# Patient Record
Sex: FEMALE | Race: ASIAN | Employment: FULL TIME | URBAN - METROPOLITAN AREA
[De-identification: names, ages, dates, MRNs, and addresses within clinical notes are randomized per-mention and may not be internally consistent; named-entity substitution may affect disease eponyms.]

---

## 2017-02-20 ENCOUNTER — HOSPITAL ENCOUNTER (EMERGENCY)
Age: 22
Discharge: HOME | End: 2017-02-20
Attending: FAMILY MEDICINE

## 2017-02-20 ENCOUNTER — TELEPHONE (OUTPATIENT)
Dept: INTERNAL MEDICINE CLINIC | Age: 22
End: 2017-02-20

## 2017-02-20 ENCOUNTER — APPOINTMENT (OUTPATIENT)
Dept: GENERAL RADIOLOGY | Age: 22
End: 2017-02-20
Attending: FAMILY MEDICINE

## 2017-02-20 VITALS
DIASTOLIC BLOOD PRESSURE: 67 MMHG | TEMPERATURE: 97.9 F | WEIGHT: 91.6 LBS | OXYGEN SATURATION: 100 % | HEART RATE: 95 BPM | BODY MASS INDEX: 16.23 KG/M2 | SYSTOLIC BLOOD PRESSURE: 112 MMHG | RESPIRATION RATE: 18 BRPM | HEIGHT: 63 IN

## 2017-02-20 DIAGNOSIS — R07.89 RIGHT-SIDED CHEST WALL PAIN: Primary | ICD-10-CM

## 2017-02-20 DIAGNOSIS — R10.11 RUQ ABDOMINAL PAIN: ICD-10-CM

## 2017-02-20 RX ORDER — HYDROCODONE BITARTRATE AND ACETAMINOPHEN 5; 325 MG/1; MG/1
1 TABLET ORAL
Qty: 20 TAB | Refills: 0 | Status: SHIPPED | OUTPATIENT
Start: 2017-02-20 | End: 2017-02-24

## 2017-02-20 RX ORDER — KETOROLAC TROMETHAMINE 30 MG/ML
30 INJECTION, SOLUTION INTRAMUSCULAR; INTRAVENOUS
Status: COMPLETED | OUTPATIENT
Start: 2017-02-20 | End: 2017-02-20

## 2017-02-20 RX ORDER — ONDANSETRON 4 MG/1
4 TABLET, ORALLY DISINTEGRATING ORAL
Qty: 10 TAB | Refills: 0 | Status: SHIPPED | OUTPATIENT
Start: 2017-02-20 | End: 2017-05-16 | Stop reason: ALTCHOICE

## 2017-02-20 RX ORDER — FAMOTIDINE 20 MG/1
20 TABLET, FILM COATED ORAL 2 TIMES DAILY
Qty: 20 TAB | Refills: 0 | Status: SHIPPED | OUTPATIENT
Start: 2017-02-20 | End: 2017-02-23 | Stop reason: ALTCHOICE

## 2017-02-20 RX ADMIN — KETOROLAC TROMETHAMINE 30 MG: 30 INJECTION, SOLUTION INTRAMUSCULAR; INTRAVENOUS at 16:56

## 2017-02-20 NOTE — LETTER
[facility] 
[address] [address] 
[phone] Work/School Note Date: 2/20/2017 To Whom It May concern: 
 
Gladis Hernandez was seen and treated today in the urgent care center by the following provider(s): 
Attending Provider: Lluvia Escamilla MD. Gladis Hernandez may return to work on 2/22/17 with light duty- no lifting/pushing and pulling x 4 days. Sincerely, Lluvia Escamilla MD

## 2017-02-20 NOTE — DISCHARGE INSTRUCTIONS

## 2017-02-20 NOTE — UC NOTE
Spoke with pt. At home and she stated that she felt much better.  Would seek ER treatment if pain continues

## 2017-02-20 NOTE — UC PROVIDER NOTE
Patient is a 25 y.o. female presenting with chest pain. The history is provided by the patient. Chest Pain    This is a new problem. The current episode started 1 to 2 hours ago. The problem has not changed since onset. The problem occurs constantly. Associated with: nothing- started suddenly when taking nap  The pain is present in the right side (lower RIBS and RUQ region ). The pain is at a severity of 8/10. The pain is severe. The quality of the pain is described as sharp. The pain radiates to the mid back. The symptoms are aggravated by movement and palpation. Associated symptoms include abdominal pain (Ruq region ), nausea and vomiting (x1 ). Pertinent negatives include no back pain, no cough, no dizziness, no fever, no irregular heartbeat, no malaise/fatigue, no near-syncope, no numbness, no palpitations, no shortness of breath and no weakness. She has tried nothing for the symptoms. Risk factors include no risk factors. Past Medical History   Diagnosis Date    Anemia         History reviewed. No pertinent past surgical history. History reviewed. No pertinent family history. Social History     Social History    Marital status:      Spouse name: N/A    Number of children: N/A    Years of education: N/A     Occupational History    Not on file. Social History Main Topics    Smoking status: Never Smoker    Smokeless tobacco: Never Used    Alcohol use No    Drug use: No    Sexual activity: No      Comment: single no children     Other Topics Concern    Not on file     Social History Narrative                ALLERGIES: Avocado    Review of Systems   Constitutional: Negative. Negative for fever and malaise/fatigue. No h/o lifting/pushing and pulling    Respiratory: Negative for cough and shortness of breath. Cardiovascular: Positive for chest pain. Negative for palpitations and near-syncope.    Gastrointestinal: Positive for abdominal pain (Ruq region ), nausea and vomiting (x1 ). Genitourinary: Negative. Musculoskeletal: Negative. Negative for back pain. Skin: Negative. Neurological: Negative for dizziness, weakness and numbness. Vitals:    02/20/17 1549   BP: 112/67   Pulse: 95   Resp: 18   Temp: 97.9 °F (36.6 °C)   SpO2: 100%   Weight: 41.5 kg (91 lb 9.6 oz)   Height: 5' 3\" (1.6 m)       Physical Exam   Constitutional: She appears distressed. Sitting slating on left side to decreased pain on rt side   HENT:   Right Ear: Tympanic membrane and ear canal normal.   Left Ear: Tympanic membrane and ear canal normal.   Nose: Nose normal.   Mouth/Throat: No oropharyngeal exudate, posterior oropharyngeal edema or posterior oropharyngeal erythema. Eyes: Conjunctivae are normal. Right eye exhibits no discharge. Left eye exhibits no discharge. No scleral icterus. Neck: Neck supple. Pulmonary/Chest: Effort normal and breath sounds normal. No respiratory distress. She has no decreased breath sounds. She has no wheezes. She has no rales. Abdominal: Soft. Bowel sounds are normal. She exhibits no distension and no mass. There is no hepatosplenomegaly. There is tenderness in the right upper quadrant. There is no rigidity, no rebound, no guarding and no CVA tenderness. Musculoskeletal: She exhibits no edema. Cervical back: Normal.        Thoracic back: Normal.        Lumbar back: Normal.   Lymphadenopathy:     She has no cervical adenopathy. Skin: No rash noted. Nursing note and vitals reviewed. MDM     Differential Diagnosis; Clinical Impression; Plan:     CLINICAL IMPRESSION:  Right-sided chest wall pain  (primary encounter diagnosis)  RUQ abdominal pain      DDX- muscluar pain/ diaphragm spasm- rib sprain/ ?  Gb pain / / kidney stone    Plan:    CXR- normal  toradol 30 mg now  Nathan Jacob  And pepcid    If worsen go to ED    No work 1 day and light duty x 4 days    Amount and/or Complexity of Data Reviewed:   Clinical lab tests: Ordered and reviewed  Tests in the radiology section of CPT®:  Ordered and reviewed  Risk of Significant Complications, Morbidity, and/or Mortality:   Presenting problems: Moderate  Diagnostic procedures: Moderate  Management options:   Moderate  Progress:   Patient progress:  Stable      Procedures

## 2017-02-23 ENCOUNTER — OFFICE VISIT (OUTPATIENT)
Dept: FAMILY MEDICINE CLINIC | Age: 22
End: 2017-02-23

## 2017-02-23 VITALS
HEART RATE: 72 BPM | SYSTOLIC BLOOD PRESSURE: 99 MMHG | BODY MASS INDEX: 16.48 KG/M2 | HEIGHT: 63 IN | OXYGEN SATURATION: 100 % | DIASTOLIC BLOOD PRESSURE: 69 MMHG | WEIGHT: 93 LBS | TEMPERATURE: 96 F | RESPIRATION RATE: 18 BRPM

## 2017-02-23 DIAGNOSIS — R11.2 NAUSEA AND VOMITING, INTRACTABILITY OF VOMITING NOT SPECIFIED, UNSPECIFIED VOMITING TYPE: ICD-10-CM

## 2017-02-23 DIAGNOSIS — R10.11 RIGHT UPPER QUADRANT ABDOMINAL PAIN: Primary | ICD-10-CM

## 2017-02-23 LAB
ALBUMIN SERPL-MCNC: 4.4 G/DL (ref 3.5–5.5)
ALBUMIN/GLOB SERPL: 2 {RATIO} (ref 1.1–2.5)
ALP SERPL-CCNC: 39 IU/L (ref 39–117)
ALT SERPL-CCNC: 16 IU/L (ref 0–32)
AMYLASE SERPL-CCNC: 68 U/L (ref 31–124)
AST SERPL-CCNC: 20 IU/L (ref 0–40)
BASOPHILS # BLD AUTO: 0 X10E3/UL (ref 0–0.2)
BASOPHILS NFR BLD AUTO: 0 %
BILIRUB SERPL-MCNC: 0.4 MG/DL (ref 0–1.2)
BILIRUB UR QL STRIP: ABNORMAL
BUN SERPL-MCNC: 9 MG/DL (ref 6–20)
BUN/CREAT SERPL: 16 (ref 8–20)
CALCIUM SERPL-MCNC: 9.1 MG/DL (ref 8.7–10.2)
CHLORIDE SERPL-SCNC: 103 MMOL/L (ref 96–106)
CO2 SERPL-SCNC: 26 MMOL/L (ref 18–29)
CREAT SERPL-MCNC: 0.58 MG/DL (ref 0.57–1)
EOSINOPHIL # BLD AUTO: 0 X10E3/UL (ref 0–0.4)
EOSINOPHIL NFR BLD AUTO: 1 %
ERYTHROCYTE [DISTWIDTH] IN BLOOD BY AUTOMATED COUNT: 13.1 % (ref 12.3–15.4)
GLOBULIN SER CALC-MCNC: 2.2 G/DL (ref 1.5–4.5)
GLUCOSE SERPL-MCNC: 73 MG/DL (ref 65–99)
GLUCOSE UR-MCNC: NEGATIVE MG/DL
HCG URINE, QL. (POC): NEGATIVE
HCT VFR BLD AUTO: 36.2 % (ref 34–46.6)
HGB BLD-MCNC: 12.8 G/DL (ref 11.1–15.9)
KETONES P FAST UR STRIP-MCNC: NEGATIVE MG/DL
LIPASE SERPL-CCNC: 37 U/L (ref 0–59)
LYMPHOCYTES # BLD AUTO: 2.4 X10E3/UL (ref 0.7–3.1)
LYMPHOCYTES NFR BLD AUTO: 48 %
MCH RBC QN AUTO: 30.5 PG (ref 26.6–33)
MCHC RBC AUTO-ENTMCNC: 35.4 G/DL (ref 31.5–35.7)
MCV RBC AUTO: 86 FL (ref 79–97)
MONOCYTES # BLD AUTO: 0.5 X10E3/UL (ref 0.1–0.9)
MONOCYTES NFR BLD AUTO: 10 %
NEUTROPHILS # BLD AUTO: 2.1 X10E3/UL (ref 1.4–7)
NEUTROPHILS NFR BLD AUTO: 41 %
PH UR STRIP: 8.5 [PH] (ref 4.6–8)
PLATELET # BLD AUTO: 274 X10E3/UL (ref 150–379)
POTASSIUM SERPL-SCNC: 4.4 MMOL/L (ref 3.5–5.2)
PROT SERPL-MCNC: 6.6 G/DL (ref 6–8.5)
PROT UR QL STRIP: ABNORMAL MG/DL
RBC # BLD AUTO: 4.2 X10E6/UL (ref 3.77–5.28)
SODIUM SERPL-SCNC: 142 MMOL/L (ref 134–144)
SP GR UR STRIP: 1.02 (ref 1–1.03)
UA UROBILINOGEN AMB POC: ABNORMAL (ref 0.2–1)
URINALYSIS CLARITY POC: ABNORMAL
URINALYSIS COLOR POC: ABNORMAL
URINE BLOOD POC: ABNORMAL
URINE LEUKOCYTES POC: NEGATIVE
URINE NITRITES POC: NEGATIVE
VALID INTERNAL CONTROL?: YES
WBC # BLD AUTO: 5.1 X10E3/UL (ref 3.4–10.8)

## 2017-02-23 RX ORDER — FAMOTIDINE 20 MG/1
20 TABLET, FILM COATED ORAL 2 TIMES DAILY
Qty: 30 TAB | Refills: 0 | Status: SHIPPED | OUTPATIENT
Start: 2017-02-23 | End: 2017-03-07 | Stop reason: ALTCHOICE

## 2017-02-23 RX ORDER — ONDANSETRON 4 MG/1
4 TABLET, ORALLY DISINTEGRATING ORAL
Qty: 15 TAB | Refills: 0 | Status: SHIPPED | OUTPATIENT
Start: 2017-02-23 | End: 2017-02-24 | Stop reason: SDUPTHER

## 2017-02-23 NOTE — PROGRESS NOTES
Chief Complaint   Patient presents with    Abdominal Pain     for 3 days, upper right quad, seen in Urgent Care on 2/20/17 and given Norco, still in pain    Vomiting

## 2017-02-23 NOTE — PROGRESS NOTES
Informed pt's  that pt's labs were all normal.  She is doing OK, went for a walk. She will have Abd U/S tomorrow morning.

## 2017-02-23 NOTE — PATIENT INSTRUCTIONS

## 2017-02-23 NOTE — PROGRESS NOTES
HISTORY OF PRESENT ILLNESS  Opal Alatorre is a 25 y.o. female. HPI  Chief Complaint   Patient presents with    Abdominal Pain     for 3 days, upper right quad, seen in Urgent Care on 2/20/17 and given Norco, still in pain    Vomiting     Pt presents today with c/o RUQ pain. She reports not feeling well and having diarrhea for 2 weeks which resolved a few days ago. Then 3 days ago she developed severe abdominal pain. She was seen at [address] [place] [address] 3 days ago for the pain. CXR was normal and she was given Hydrocodone, Zofran, and Pepcid. She is taking the Hydrocodone which is not helping the pain. She only took 1-2 doses of Pepcid then stopped. She is taking Zofran which helps minimally. Her abdominal pain has persisted, is a little better than 3 days ago, and is constant. Pain does not seem to be related to eating. Pain is worse when lying supine. Also has h/a, nausea, and vomiting (small amounts). She also c/o feeling dizzy and has a bitter taste in her mouth. She is able to eat and drink. She does not vomit after eating or drinking. Last BM was yesterday and now she feels a little constipated. Her  is here with her today and feels she looks pale. He is also concerned about her symptoms and her weight. Pt also mentions that for several months, on almost a daily basis, feels dizzy when she first wakes up. Also is sometimes hard to catch her breath. States she \"blacks out\" sometimes. Her  has never witnessed fainting episodes. Denies any ETOH use. History of anemia, she used to take iron but no longer takes this. Her periods used to be irregular but are now regular with OCP. She is on her menses now. She reports heavy menstrual flow for about 4 days when on her cycle. Past Medical History:   Diagnosis Date    Anemia     Ovarian cyst 2016     History reviewed. No pertinent surgical history.     Allergies   Allergen Reactions    Avocado Rash       Review of Systems Constitutional: Negative for chills, diaphoresis, fever, malaise/fatigue and weight loss. Eyes: Negative. Respiratory: Negative. Cardiovascular: Negative. Negative for chest pain and palpitations. Gastrointestinal: Positive for abdominal pain, constipation, heartburn, nausea and vomiting. Genitourinary: Negative. Musculoskeletal: Negative. Skin: Negative. Neurological: Positive for dizziness. Endo/Heme/Allergies: Negative. Psychiatric/Behavioral: Negative. Physical Exam   Constitutional: She is oriented to person, place, and time. She appears well-developed and well-nourished. No distress. HENT:   Head: Normocephalic and atraumatic. Nose: Nose normal.   Mouth/Throat: Oropharynx is clear and moist.   Eyes: Conjunctivae are normal. Pupils are equal, round, and reactive to light. Right eye exhibits no discharge. No scleral icterus. Cardiovascular: Normal rate, regular rhythm and normal heart sounds. Pulmonary/Chest: Effort normal and breath sounds normal.   Abdominal: Soft. Normal appearance and bowel sounds are normal. She exhibits no distension and no mass. There is no hepatosplenomegaly. There is tenderness in the right upper quadrant. There is positive Vance's sign. There is no rigidity, no rebound, no guarding and no CVA tenderness. Musculoskeletal: Normal range of motion. Neurological: She is alert and oriented to person, place, and time. No cranial nerve deficit. Skin: Skin is warm and dry. She is not diaphoretic. There is pallor. Psychiatric: She has a normal mood and affect.  Her behavior is normal. Judgment and thought content normal.     Results for orders placed or performed in visit on 02/23/17   AMB POC URINE PREGNANCY TEST, VISUAL COLOR COMPARISON   Result Value Ref Range    VALID INTERNAL CONTROL POC Yes     HCG urine, Ql. (POC) Negative Negative   AMB POC URINALYSIS DIP STICK AUTO W/O MICRO   Result Value Ref Range    Color (UA POC) Red Clarity (UA POC) Cloudy     Glucose (UA POC) Negative Negative    Bilirubin (UA POC) 1+ Negative    Ketones (UA POC) Negative Negative    Specific gravity (UA POC) 1.025 1.001 - 1.035    Blood (UA POC) 3+ Negative    pH (UA POC) 8.5 (A) 4.6 - 8.0    Protein (UA POC) 3+ Negative mg/dL    Urobilinogen (UA POC) 1 mg/dL 0.2 - 1    Nitrites (UA POC) Negative Negative    Leukocyte esterase (UA POC) Negative Negative     Visit Vitals    BP 99/69    Pulse 72    Temp 96 °F (35.6 °C) (Oral)    Resp 18    Ht 5' 3\" (1.6 m)    Wt 93 lb (42.2 kg)    LMP 02/21/2017 (Exact Date)    SpO2 100%    BMI 16.47 kg/m2       ASSESSMENT and PLAN    ICD-10-CM ICD-9-CM    1. Right upper quadrant abdominal pain R10.11 789.01 CBC WITH AUTOMATED DIFF      METABOLIC PANEL, COMPREHENSIVE      AMYLASE      LIPASE      US ABD LTD      AMB POC URINALYSIS DIP STICK AUTO W/O MICRO   2. Nausea and vomiting, intractability of vomiting not specified, unspecified vomiting type R11.2 787.01 CBC WITH AUTOMATED DIFF      METABOLIC PANEL, COMPREHENSIVE      AMYLASE      LIPASE      US ABD LTD      AMB POC URINE PREGNANCY TEST, VISUAL COLOR COMPARISON      AMB POC URINALYSIS DIP STICK AUTO W/O MICRO     Will check labs and abd U/S to R/o GB disease. Discussed with pt and  that if pain becomes severe or if unable to tolerate fluids, she needs to go to ED. For now, discussed importance of plenty of clear liquids and slowly advance to bland diet. May discontinue Hydrocodone as not really helping and constipation. Recommend Pepcid twice daily and Zofran prn nausea. For other concerns, she may have orthostatic hypotension, briefly discussed management of this. F/u with PCP on this and regarding her weight. She needs to f/u with her PCP in 2 days.     Colton Morales NP

## 2017-02-23 NOTE — MR AVS SNAPSHOT
Visit Information Date & Time Provider Department Dept. Phone Encounter #  
 2/23/2017 12:30 PM Yelena Mccray NP [address] [facility] [phone] [id number] Follow-up Instructions Return if symptoms worsen or fail to improve. Your Appointments 6/16/2017 10:00 AM  
New Patient with Genesis Woodard MD  
[employer] [employer] [facility]) Appt Note: to get est  
 [address] [facility] [address] [facility] [address]  
[id number] [address] Upcoming Health Maintenance Date Due  
 HPV AGE 9Y-34Y (1 of 3 - Female 3 Dose Series) 1/16/2007 DTaP/Tdap/Td series (1 - Tdap) 1/16/2017 PAP AKA CERVICAL CYTOLOGY 1/16/2017 Allergies as of 2/23/2017  Review Complete On: 2/23/2017 By: Yelena Mccray NP Severity Noted Reaction Type Reactions Avocado  08/14/2015    Rash Current Immunizations  Reviewed on 9/21/2016 Name Date Influenza Vaccine (Quad) PF 9/21/2016 Not reviewed this visit You Were Diagnosed With   
  
 Codes Comments Right upper quadrant abdominal pain    -  Primary ICD-10-CM: R10.11 ICD-9-CM: 789.01 Nausea and vomiting, intractability of vomiting not specified, unspecified vomiting type     ICD-10-CM: R11.2 ICD-9-CM: 787.01 Vitals BP  
  
  
  
  
  
 99/69 BMI and BSA Data Body Mass Index Body Surface Area  
 16.47 kg/m 2 1.37 m 2 Preferred Pharmacy Pharmacy Name Phone [facility] [phone] [facility] [phone] Your Updated Medication List  
  
   
This list is accurate as of: 2/23/17  1:16 PM.  Always use your most recent med list.  
  
  
  
  
 famotidine 20 mg tablet Commonly known as:  PEPCID Take 1 Tab by mouth two (2) times a day. HYDROcodone-acetaminophen 5-325 mg per tablet Commonly known as:  Raj Tilley  
 Take 1 Tab by mouth every eight (8) hours as needed for Pain. Max Daily Amount: 3 Tabs. * ondansetron 4 mg disintegrating tablet Commonly known as:  ZOFRAN ODT Take 1 Tab by mouth every eight (8) hours as needed for Nausea. * ondansetron 4 mg disintegrating tablet Commonly known as:  ZOFRAN ODT Take 1 Tab by mouth every eight (8) hours as needed for Nausea. * Notice: This list has 2 medication(s) that are the same as other medications prescribed for you. Read the directions carefully, and ask your doctor or other care provider to review them with you. Prescriptions Sent to Pharmacy Refills  
 ondansetron (ZOFRAN ODT) 4 mg disintegrating tablet 0 Sig: Take 1 Tab by mouth every eight (8) hours as needed for Nausea. Class: Normal  
 Pharmacy: [address] [place] [address] Ph #: [phone] Route: Oral  
 famotidine (PEPCID) 20 mg tablet 0 Sig: Take 1 Tab by mouth two (2) times a day. Class: Normal  
 Pharmacy: [address] [place] [address] Ph #: [phone] Route: Oral  
  
We Performed the Following AMB POC URINALYSIS DIP STICK AUTO W/O MICRO [39282 CPT(R)] AMB POC URINE PREGNANCY TEST, VISUAL COLOR COMPARISON [73177 CPT(R)] AMYLASE [id number] CPT(R)] CBC WITH AUTOMATED DIFF [20811 CPT(R)] LIPASE [id number] CPT(R)] METABOLIC PANEL, COMPREHENSIVE [39398 CPT(R)] Follow-up Instructions Return if symptoms worsen or fail to improve. To-Do List   
 02/23/2017 Imaging:  US ABD LTD Patient Instructions Abdominal Pain: Care Instructions Your Care Instructions Abdominal pain has many possible causes. Some aren't serious and get better on their own in a few days. Others need more testing and treatment. If your pain continues or gets worse, you need to be rechecked and may need more tests to find out what is wrong.  You may need surgery to correct the problem. Don't ignore new symptoms, such as fever, nausea and vomiting, urination problems, pain that gets worse, and dizziness. These may be signs of a more serious problem. Your doctor may have recommended a follow-up visit in the next 8 to 12 hours. If you are not getting better, you may need more tests or treatment. The doctor has checked you carefully, but problems can develop later. If you notice any problems or new symptoms, get medical treatment right away. Follow-up care is a key part of your treatment and safety. Be sure to make and go to all appointments, and call your doctor if you are having problems. It's also a good idea to know your test results and keep a list of the medicines you take. How can you care for yourself at home? · Rest until you feel better. · To prevent dehydration, drink plenty of fluids, enough so that your urine is light yellow or clear like water. Choose water and other caffeine-free clear liquids until you feel better. If you have kidney, heart, or liver disease and have to limit fluids, talk with your doctor before you increase the amount of fluids you drink. · If your stomach is upset, eat mild foods, such as rice, dry toast or crackers, bananas, and applesauce. Try eating several small meals instead of two or three large ones. · Wait until 48 hours after all symptoms have gone away before you have spicy foods, alcohol, and drinks that contain caffeine. · Do not eat foods that are high in fat. · Avoid anti-inflammatory medicines such as aspirin, ibuprofen (Advil, Motrin), and naproxen (Aleve). These can cause stomach upset. Talk to your doctor if you take daily aspirin for another health problem. When should you call for help? Call 911 anytime you think you may need emergency care. For example, call if: 
· You passed out (lost consciousness). · You pass maroon or very bloody stools. · You vomit blood or what looks like coffee grounds. · You have new, severe belly pain. Call your doctor now or seek immediate medical care if: 
· Your pain gets worse, especially if it becomes focused in one area of your belly. · You have a new or higher fever. · Your stools are black and look like tar, or they have streaks of blood. · You have unexpected vaginal bleeding. · You have symptoms of a urinary tract infection. These may include: 
¨ Pain when you urinate. ¨ Urinating more often than usual. 
¨ Blood in your urine. · You are dizzy or lightheaded, or you feel like you may faint. Watch closely for changes in your health, and be sure to contact your doctor if: 
· You are not getting better after 1 day (24 hours). Where can you learn more? Go to http://maricruz-katy.info/. Enter A137 in the search box to learn more about \"Abdominal Pain: Care Instructions. \" Current as of: May 27, 2016 Content Version: 11.1 © 4584-8739 netprice.com. Care instructions adapted under license by [employer] (which disclaims liability or warranty for this information). If you have questions about a medical condition or this instruction, always ask your healthcare professional. [place] [address] any warranty or liability for your use of this information. Introducing [facility]! Tejas Ramos introduces [employer] patient portal. Now you can access parts of your medical record, email your doctor's office, and request medication refills online. 1. In your internet browser, go to [email or web address] 2. Click on the First Time User? Click Here link in the Sign In box. You will see the New Member Sign Up page. 3. Enter your [employer] Access Code exactly as it appears below. You will not need to use this code after youve completed the sign-up process. If you do not sign up before the expiration date, you must request a new code.  
 
· [employer] Access Code: [email or web address] 
 Expires: 5/21/2017  3:05 PM 
 
4. Enter the last four digits of your Social Security Number (xxxx) and Date of Birth (mm/dd/yyyy) as indicated and click Submit. You will be taken to the next sign-up page. 5. Create a [employer] ID. This will be your [employer] login ID and cannot be changed, so think of one that is secure and easy to remember. 6. Create a [employer] password. You can change your password at any time. 7. Enter your Password Reset Question and Answer. This can be used at a later time if you forget your password. 8. Enter your e-mail address. You will receive e-mail notification when new information is available in [address] 9. Click Sign Up. You can now view and download portions of your medical record. 10. Click the Download Summary menu link to download a portable copy of your medical information. If you have questions, please visit the Frequently Asked Questions section of the [employer] website. Remember, [employer] is NOT to be used for urgent needs. For medical emergencies, dial 911. Now available from your iPhone and Android! Please provide this summary of care documentation to your next provider. Your primary care clinician is listed as NONE. If you have any questions after today's visit, please call [phone].

## 2017-02-24 ENCOUNTER — HOSPITAL ENCOUNTER (OUTPATIENT)
Dept: ULTRASOUND IMAGING | Age: 22
Discharge: HOME | End: 2017-02-24
Attending: NURSE PRACTITIONER
Payer: COMMERCIAL

## 2017-02-24 ENCOUNTER — APPOINTMENT (OUTPATIENT)
Dept: NUCLEAR MEDICINE | Age: 22
End: 2017-02-24
Attending: PHYSICIAN ASSISTANT
Payer: COMMERCIAL

## 2017-02-24 ENCOUNTER — HOSPITAL ENCOUNTER (EMERGENCY)
Age: 22
Discharge: HOME | End: 2017-02-24
Attending: EMERGENCY MEDICINE
Payer: COMMERCIAL

## 2017-02-24 VITALS
BODY MASS INDEX: 16.3 KG/M2 | TEMPERATURE: 97.7 F | HEART RATE: 76 BPM | DIASTOLIC BLOOD PRESSURE: 70 MMHG | RESPIRATION RATE: 16 BRPM | HEIGHT: 63 IN | SYSTOLIC BLOOD PRESSURE: 103 MMHG | WEIGHT: 92 LBS | OXYGEN SATURATION: 97 %

## 2017-02-24 DIAGNOSIS — R10.11 RIGHT UPPER QUADRANT ABDOMINAL PAIN: ICD-10-CM

## 2017-02-24 DIAGNOSIS — R11.2 NAUSEA AND VOMITING, INTRACTABILITY OF VOMITING NOT SPECIFIED, UNSPECIFIED VOMITING TYPE: ICD-10-CM

## 2017-02-24 DIAGNOSIS — R10.11 ABDOMINAL PAIN, RIGHT UPPER QUADRANT: Primary | ICD-10-CM

## 2017-02-24 LAB
ALBUMIN SERPL BCP-MCNC: 4.3 G/DL (ref 3.5–5)
ALBUMIN/GLOB SERPL: 1.1 {RATIO} (ref 1.1–2.2)
ALP SERPL-CCNC: 46 U/L (ref 45–117)
ALT SERPL-CCNC: 29 U/L (ref 12–78)
ANION GAP BLD CALC-SCNC: 6 MMOL/L (ref 5–15)
AST SERPL W P-5'-P-CCNC: 24 U/L (ref 15–37)
BASOPHILS # BLD AUTO: 0 K/UL (ref 0–0.1)
BASOPHILS # BLD: 0 % (ref 0–1)
BILIRUB SERPL-MCNC: 0.6 MG/DL (ref 0.2–1)
BUN SERPL-MCNC: 7 MG/DL (ref 6–20)
BUN/CREAT SERPL: 10 (ref 12–20)
CALCIUM SERPL-MCNC: 9.6 MG/DL (ref 8.5–10.1)
CHLORIDE SERPL-SCNC: 104 MMOL/L (ref 97–108)
CO2 SERPL-SCNC: 28 MMOL/L (ref 21–32)
CREAT SERPL-MCNC: 0.7 MG/DL (ref 0.55–1.02)
EOSINOPHIL # BLD: 0.1 K/UL (ref 0–0.4)
EOSINOPHIL NFR BLD: 1 % (ref 0–7)
ERYTHROCYTE [DISTWIDTH] IN BLOOD BY AUTOMATED COUNT: 12.7 % (ref 11.5–14.5)
GLOBULIN SER CALC-MCNC: 3.9 G/DL (ref 2–4)
GLUCOSE SERPL-MCNC: 76 MG/DL (ref 65–100)
HCT VFR BLD AUTO: 38.2 % (ref 35–47)
HGB BLD-MCNC: 13 G/DL (ref 11.5–16)
LIPASE SERPL-CCNC: 123 U/L (ref 73–393)
LYMPHOCYTES # BLD AUTO: 35 % (ref 12–49)
LYMPHOCYTES # BLD: 2.2 K/UL (ref 0.8–3.5)
MCH RBC QN AUTO: 29.5 PG (ref 26–34)
MCHC RBC AUTO-ENTMCNC: 34 G/DL (ref 30–36.5)
MCV RBC AUTO: 86.8 FL (ref 80–99)
MONOCYTES # BLD: 0.4 K/UL (ref 0–1)
MONOCYTES NFR BLD AUTO: 6 % (ref 5–13)
NEUTS SEG # BLD: 3.8 K/UL (ref 1.8–8)
NEUTS SEG NFR BLD AUTO: 58 % (ref 32–75)
PLATELET # BLD AUTO: 275 K/UL (ref 150–400)
POTASSIUM SERPL-SCNC: 3.9 MMOL/L (ref 3.5–5.1)
PROT SERPL-MCNC: 8.2 G/DL (ref 6.4–8.2)
RBC # BLD AUTO: 4.4 M/UL (ref 3.8–5.2)
SODIUM SERPL-SCNC: 138 MMOL/L (ref 136–145)
WBC # BLD AUTO: 6.5 K/UL (ref 3.6–11)

## 2017-02-24 PROCEDURE — 36415 COLL VENOUS BLD VENIPUNCTURE: CPT | Performed by: NURSE PRACTITIONER

## 2017-02-24 PROCEDURE — 74011250637 HC RX REV CODE- 250/637: Performed by: NURSE PRACTITIONER

## 2017-02-24 PROCEDURE — 74011250636 HC RX REV CODE- 250/636: Performed by: EMERGENCY MEDICINE

## 2017-02-24 PROCEDURE — A9537 TC99M MEBROFENIN: HCPCS

## 2017-02-24 PROCEDURE — 76705 ECHO EXAM OF ABDOMEN: CPT

## 2017-02-24 PROCEDURE — 96375 TX/PRO/DX INJ NEW DRUG ADDON: CPT

## 2017-02-24 PROCEDURE — 74011250636 HC RX REV CODE- 250/636: Performed by: NURSE PRACTITIONER

## 2017-02-24 PROCEDURE — 80053 COMPREHEN METABOLIC PANEL: CPT | Performed by: NURSE PRACTITIONER

## 2017-02-24 PROCEDURE — 74011000258 HC RX REV CODE- 258: Performed by: EMERGENCY MEDICINE

## 2017-02-24 PROCEDURE — 99284 EMERGENCY DEPT VISIT MOD MDM: CPT

## 2017-02-24 PROCEDURE — 96374 THER/PROPH/DIAG INJ IV PUSH: CPT

## 2017-02-24 PROCEDURE — 96361 HYDRATE IV INFUSION ADD-ON: CPT

## 2017-02-24 PROCEDURE — 83690 ASSAY OF LIPASE: CPT | Performed by: NURSE PRACTITIONER

## 2017-02-24 PROCEDURE — 85025 COMPLETE CBC W/AUTO DIFF WBC: CPT | Performed by: NURSE PRACTITIONER

## 2017-02-24 RX ORDER — ONDANSETRON 2 MG/ML
4 INJECTION INTRAMUSCULAR; INTRAVENOUS
Status: COMPLETED | OUTPATIENT
Start: 2017-02-24 | End: 2017-02-24

## 2017-02-24 RX ORDER — HYDROCODONE BITARTRATE AND ACETAMINOPHEN 5; 325 MG/1; MG/1
1 TABLET ORAL
Qty: 10 TAB | Refills: 0 | Status: SHIPPED | OUTPATIENT
Start: 2017-02-24 | End: 2017-03-07 | Stop reason: SDUPTHER

## 2017-02-24 RX ORDER — SODIUM CHLORIDE 9 MG/ML
25 INJECTION, SOLUTION INTRAVENOUS
Status: COMPLETED | OUTPATIENT
Start: 2017-02-24 | End: 2017-02-24

## 2017-02-24 RX ORDER — ACETAMINOPHEN 500 MG
1000 TABLET ORAL
Status: COMPLETED | OUTPATIENT
Start: 2017-02-24 | End: 2017-02-24

## 2017-02-24 RX ORDER — HYDROMORPHONE HYDROCHLORIDE 1 MG/ML
1 INJECTION, SOLUTION INTRAMUSCULAR; INTRAVENOUS; SUBCUTANEOUS
Status: COMPLETED | OUTPATIENT
Start: 2017-02-24 | End: 2017-02-24

## 2017-02-24 RX ORDER — SODIUM CHLORIDE 9 MG/ML
1000 INJECTION, SOLUTION INTRAVENOUS ONCE
Status: COMPLETED | OUTPATIENT
Start: 2017-02-24 | End: 2017-02-24

## 2017-02-24 RX ADMIN — ONDANSETRON 4 MG: 2 INJECTION INTRAMUSCULAR; INTRAVENOUS at 13:09

## 2017-02-24 RX ADMIN — HYDROMORPHONE HYDROCHLORIDE 1 MG: 1 INJECTION, SOLUTION INTRAMUSCULAR; INTRAVENOUS; SUBCUTANEOUS at 13:09

## 2017-02-24 RX ADMIN — ACETAMINOPHEN 1000 MG: 500 TABLET, FILM COATED ORAL at 19:52

## 2017-02-24 RX ADMIN — SINCALIDE 0.83 MCG: 5 INJECTION, POWDER, LYOPHILIZED, FOR SOLUTION INTRAVENOUS at 18:18

## 2017-02-24 RX ADMIN — SODIUM CHLORIDE 25 ML: 900 INJECTION, SOLUTION INTRAVENOUS at 18:17

## 2017-02-24 RX ADMIN — SODIUM CHLORIDE 1000 ML: 900 INJECTION, SOLUTION INTRAVENOUS at 13:09

## 2017-02-24 NOTE — CONSULTS
Surgery Consult    Subjective:      Surgical consultation was called on Diana Pal who is a 25 y.o. female who c/o  Abdominal pain . Patient's pain is located RUQ. Pt reports nausea and vomiting and no fever or chills. The pain is described as sharp and stabbing, constant, seen by urgent care earlier in the week and was told it was likely musculoskeletal and put on hydrocodone prn,  . Her pain is  is 7/10 in intensity. Pain is nonradiating Onset was 4 days ago, acute onset, waking her from sleep on Monday 2/20. Symptoms have been unchanged since. She is voiding well, no diarrhea, diminished appetite, ate some rice and soup before initial onset. LMP:  Current, denies any GI hx. No bloody emesis  No CP, no SOB, pain is worse laying down and better when she is standing. Pt denies previous surgical hx  Recent HA's and took motrin 800 3 x the week before onset, no hx PUD  Denies tobacco or ETOH      Prior similar episodes:  no      Past Medical History:   Diagnosis Date    Anemia     Ovarian cyst 2016     History reviewed. No pertinent surgical history. Social History     Social History    Marital status:      Spouse name: N/A    Number of children: N/A    Years of education: N/A     Social History Main Topics    Smoking status: Never Smoker    Smokeless tobacco: Never Used    Alcohol use No    Drug use: No    Sexual activity: No      Comment: single no children     Other Topics Concern    None     Social History Narrative      No current facility-administered medications for this encounter. Current Outpatient Prescriptions   Medication Sig    famotidine (PEPCID) 20 mg tablet Take 1 Tab by mouth two (2) times a day.  HYDROcodone-acetaminophen (NORCO) 5-325 mg per tablet Take 1 Tab by mouth every eight (8) hours as needed for Pain. Max Daily Amount: 3 Tabs.  ondansetron (ZOFRAN ODT) 4 mg disintegrating tablet Take 1 Tab by mouth every eight (8) hours as needed for Nausea. Allergies   Allergen Reactions    Avocado Rash       Review of Systems:  Pertinent items are noted in the History of Present Illness. Objective:      Patient Vitals for the past 8 hrs:   BP Temp Pulse Resp SpO2 Height Weight   17 1133 113/78 98 °F (36.7 °C) 77 16 94 % 5' 3\" (1.6 m) 92 lb (41.7 kg)       Temp (24hrs), Av °F (36.7 °C), Min:98 °F (36.7 °C), Max:98 °F (36.7 °C)      Physical Exam:   alert, cooperative, no distress, appears stated age  Cardiac exam:  normal S1 and S2                        Lungs: Normal chest wall and respirations. Clear to auscultation. Abdomen: soft, nondistended, normal bowel sounds, tenderness mild - in the RUQ, without guarding, without rebound  Incisions:  na  Neuro: alert, oriented x 3, no defects noted in general exam.  Extremities:  no edema          Labs:   CBC: Lab Results   Component Value Date/Time    WBC 6.5 2017 12:51 PM    RBC 4.40 2017 12:51 PM    HGB 13.0 2017 12:51 PM    HCT 38.2 2017 12:51 PM    PLATELET 588  12:51 PM     BMP: Lab Results   Component Value Date/Time    Glucose 2017 12:51 PM    Sodium 138 2017 12:51 PM    Potassium 3.9 2017 12:51 PM    Chloride 2017 12:51 PM    CO2 2017 12:51 PM    BUN 7 2017 12:51 PM    Creatinine 0.70 2017 12:51 PM    Calcium 9.6 2017 12:51 PM     CMP:  Lab Results   Component Value Date/Time    Glucose 76 2017 12:51 PM    Sodium 138 2017 12:51 PM    Potassium 3.9 2017 12:51 PM    Chloride 104 2017 12:51 PM    CO2 28 2017 12:51 PM    BUN 7 2017 12:51 PM    Creatinine 0.70 2017 12:51 PM    Calcium 9.6 2017 12:51 PM    Anion gap 6 2017 12:51 PM    BUN/Creatinine ratio 10 2017 12:51 PM    Alk.  phosphatase 46 2017 12:51 PM    Protein, total 8.2 2017 12:51 PM    Albumin 4.3 2017 12:51 PM    Globulin 3.9 2017 12:51 PM    A-G Ratio 1.1 2017 12:51 PM       Radiology review: US    IMPRESSION  IMPRESSION:   Mild diffuse gallbladder wall thickening, a nonspecific finding. No evidence of  gallstones or biliary ductal dilatation. Assessment:     25 y.o. female who presents with 4 day hx of  RUQ abdominal pain     Associate n/v      Plan:   Labs are unremarkable, US with no stones or sludge, mild wall thickening, no fluid   Diet:npo   Pain management   Discussed pt with Dr. Lily Loredo and would like a HIDA, order placed   Pt received IV dilaudid at 1 PM so nuc med will require waiting 4 hours.     Further plan pending HIDA result   Trang Noble PA-C  Signed By: DOMONIQUE Contreras     February 24, 2017

## 2017-02-24 NOTE — PROGRESS NOTES
Spoke with Ana oHng from U/S. Abd U/S shows gallbladder wall thickening 2.8mm but no stones or sludge. She was very tender on U/S exam.  Spoke with pt who continues to have RUQ pain. Recommend further evaluation in ED now. Pt verbalized understanding.

## 2017-02-24 NOTE — ED PROVIDER NOTES
HPI Comments: 26 yo F with hx of anemia, ovarian cyst presents ambulatory to the ED with RUQ discomfort since Monday.  + vomiting, pain is worse after eating. Denies fever, chills, diarrhea, urinary sx. Was evaluated by the [facility] yesterday with labs, UA yesterday and US ordered for today. US impression:  mild diffuse gallbladder wall thickening, up to 2.8 mm. Pt sent to the ED today per the [facility] for evaluation by general surgeon. LMP now. Past Medical History:  No date: Anemia  2016: Ovarian cyst    Social History    Marital status:              Spouse name:                       Years of education:                 Number of children:               Occupational History    None on file    Social History Main Topics    Smoking status: Never Smoker                                                                Smokeless status: Never Used                        Alcohol use: No              Drug use: No              Sexual activity: No                      Comment: single no children    Other Topics            Concern    None on file    Social History Narrative    None on file              Patient is a 25 y.o. female presenting with abdominal pain. Abdominal Pain    Associated symptoms include nausea and vomiting. Pertinent negatives include no fever, no diarrhea, no dysuria, no hematuria, no headaches, no chest pain and no back pain. Past Medical History:   Diagnosis Date    Anemia     Ovarian cyst 2016       History reviewed. No pertinent surgical history. History reviewed. No pertinent family history. Social History     Social History    Marital status:      Spouse name: N/A    Number of children: N/A    Years of education: N/A     Occupational History    Not on file.      Social History Main Topics    Smoking status: Never Smoker    Smokeless tobacco: Never Used    Alcohol use No    Drug use: No    Sexual activity: No      Comment: single no children     Other Topics Concern    Not on file     Social History Narrative         ALLERGIES: Avocado    Review of Systems   Constitutional: Negative for activity change, appetite change, chills and fever. HENT: Negative for ear discharge and facial swelling. Eyes: Negative for discharge and redness. Respiratory: Negative for chest tightness, shortness of breath and wheezing. Cardiovascular: Negative for chest pain, palpitations and leg swelling. Gastrointestinal: Positive for abdominal pain, nausea and vomiting. Negative for diarrhea and rectal pain. Genitourinary: Negative for difficulty urinating, dysuria, hematuria and urgency. Musculoskeletal: Negative for back pain, joint swelling, neck pain and neck stiffness. Skin: Negative for rash. Neurological: Negative for seizures, speech difficulty, weakness and headaches. Psychiatric/Behavioral: Negative for confusion. Vitals:    02/24/17 1133   BP: 113/78   Pulse: 77   Resp: 16   Temp: 98 °F (36.7 °C)   SpO2: 94%   Weight: 41.7 kg (92 lb)   Height: 5' 3\" (1.6 m)            Physical Exam   Constitutional: She is oriented to person, place, and time. She appears well-developed and well-nourished. No distress. HENT:   Head: Normocephalic and atraumatic. Eyes: Conjunctivae and EOM are normal. Pupils are equal, round, and reactive to light. Neck: Normal range of motion. Neck supple. Cardiovascular: Normal rate, regular rhythm, normal heart sounds and intact distal pulses. Pulmonary/Chest: Effort normal and breath sounds normal. She has no decreased breath sounds. She has no wheezes. B breath    Abdominal: Soft. Bowel sounds are normal. She exhibits no distension and no mass. There is tenderness in the right upper quadrant. There is no rigidity, no rebound, no guarding and no CVA tenderness. Abdomen soft with RUQ tenderness, no rigidity, masses or guarding. No flank or CVA tenderness. BS active throughout. Musculoskeletal: Normal range of motion. Neurological: She is alert and oriented to person, place, and time. She has normal strength. She displays no atrophy. No cranial nerve deficit or sensory deficit. She exhibits normal muscle tone. Coordination and gait normal. GCS eye subscore is 4. GCS verbal subscore is 5. GCS motor subscore is 6. Skin: Skin is warm and dry. Psychiatric: She has a normal mood and affect. Her behavior is normal. Judgment and thought content normal.   Nursing note and vitals reviewed. MDM  Number of Diagnoses or Management Options  Abdominal pain, right upper quadrant:   Diagnosis management comments: 26 yo F sent to the ED for evaluation by general surgeon for GB wall thickening. Pt has had RUQ tenderness since Monday.  + nausea and vomiting. Denies fever, chills, dysuria, diarrhea or CP. Labs, hydration, medication for pain, nausea ordered. Consult to general surgery. Discussed hx, presentation and findings with Dr. Armen Deluna who personally evaluated the pt. General surgery NP evaluated the pt in the ED. HIDA scan ordered by general surgery. Plan of care will be determined after HIDA scan results. HIDA scan negative. Consult to general surgery. Spoke with Dr. Abi Ma. Recommend FU with GI, continue PPI, return to ED for worsening sx. Discussed plan of care with pt who agrees with plan of care and discharge plan. Encouraged pt to take stool softener daily while taking hydrocodone. Pt notes no increase in abdominal discomfort prior to discharge. States her headache is current source of pain. Tylenol ordered and administered prior to discharge. Amount and/or Complexity of Data Reviewed  Clinical lab tests: ordered and reviewed  Tests in the radiology section of CPT®: reviewed and ordered (Reviewed US from 02/24/2017)    Patient Progress  Patient progress: stable    ED Course       Procedures  Progress note: Pt still awaiting GI consult. Paged again by . 2:35 PM  Progress note:  GI NP at bedside to evaluate pt.2:51 PM     Consult note:  Spoke with Dr. Rabia Guajardo regarding HIDA scan findings. Recommends continue PPI, FU with GI for possible PUD. Return to ED for worsening sx. Pt is not currently a surgical issue based on findings.  7:52 PM

## 2017-02-24 NOTE — ED TRIAGE NOTES
Pt sent here from [facility] after having an abd ultrasound done this morning. Pt states she has been having upper right quad abd pain since this past Monday.   Pt was sent here for further eval.

## 2017-02-25 NOTE — ED NOTES
Pt discharged from ED with prescription and follow up care instructions; pt verbalized understanding. VSS. NAD. Pt ambulatory from ED with steady gait.

## 2017-02-25 NOTE — DISCHARGE INSTRUCTIONS
Peptic Ulcer Disease: Care Instructions  Your Care Instructions    Peptic ulcers are sores on the inside of the stomach or the small intestine. They are usually caused by an infection with bacteria or from use of nonsteroidal anti-inflammatory drugs (NSAIDs). NSAIDs include aspirin, ibuprofen (Advil), and naproxen (Aleve). Your doctor may have prescribed medicine to reduce stomach acid. You also may need to take antibiotics if your peptic ulcers are caused by an infection. You can help your stomach heal and keep ulcers from coming back by making some changes in your lifestyle. Quit smoking, limit caffeine and alcohol, and reduce stress. Follow-up care is a key part of your treatment and safety. Be sure to make and go to all appointments, and call your doctor if you are having problems. Its also a good idea to know your test results and keep a list of the medicines you take. How can you care for yourself at home? · Take your medicines exactly as prescribed. Call your doctor if you think you are having a problem with your medicine. · Do not take aspirin or other NSAIDs such as ibuprofen (Advil or Motrin) or naproxen (Aleve). Ask your doctor what you can take for pain. · Do not smoke. Smoking can make ulcers worse. If you need help quitting, talk to your doctor about stop-smoking programs and medicines. These can increase your chances of quitting for good. · Drink in moderation or avoid drinking alcohol. · Do not drink beverages that have caffeine if they bother your stomach. These include coffee, tea, and soda. · Eat a balanced diet of small, frequent meals. Make an appointment with a dietitian if you need help planning your meals. · Reduce stress. Avoid people and places that make you feel anxious, if you can. Learn ways to reduce stress, such as biofeedback, guided imagery, and meditation. When should you call for help? Call 911 anytime you think you may need emergency care.  For example, call if:  · You passed out (lost consciousness). · You vomit blood or what looks like coffee grounds. · You pass maroon or very bloody stools. Call your doctor now or seek immediate medical care if:  · You have severe pain in your belly, back, or shoulders. · You have new or worsening belly pain. · You are dizzy or lightheaded, or you feel like you may faint. · Your stools are black and tarlike or have streaks of blood. Watch closely for changes in your health, and be sure to contact your doctor if:  · You have new symptoms such as weight loss, nausea or vomiting. · You do not feel better as expected. Where can you learn more? Go to http://maricruz-katy.info/. Enter R476 in the search box to learn more about \"Peptic Ulcer Disease: Care Instructions. \"  Current as of: August 9, 2016  Content Version: 11.1  © 0822-5406 [employer]. Care instructions adapted under license by [facility] (which disclaims liability or warranty for this information). If you have questions about a medical condition or this instruction, always ask your healthcare professional. [place] [address] any warranty or liability for your use of this information. Abdominal Pain: Care Instructions  Your Care Instructions    Abdominal pain has many possible causes. Some aren't serious and get better on their own in a few days. Others need more testing and treatment. If your pain continues or gets worse, you need to be rechecked and may need more tests to find out what is wrong. You may need surgery to correct the problem. Don't ignore new symptoms, such as fever, nausea and vomiting, urination problems, pain that gets worse, and dizziness. These may be signs of a more serious problem. Your doctor may have recommended a follow-up visit in the next 8 to 12 hours. If you are not getting better, you may need more tests or treatment.   The doctor has checked you carefully, but problems can develop later. If you notice any problems or new symptoms, get medical treatment right away. Follow-up care is a key part of your treatment and safety. Be sure to make and go to all appointments, and call your doctor if you are having problems. It's also a good idea to know your test results and keep a list of the medicines you take. How can you care for yourself at home? · Rest until you feel better. · To prevent dehydration, drink plenty of fluids, enough so that your urine is light yellow or clear like water. Choose water and other caffeine-free clear liquids until you feel better. If you have kidney, heart, or liver disease and have to limit fluids, talk with your doctor before you increase the amount of fluids you drink. · If your stomach is upset, eat mild foods, such as rice, dry toast or crackers, bananas, and applesauce. Try eating several small meals instead of two or three large ones. · Wait until 48 hours after all symptoms have gone away before you have spicy foods, alcohol, and drinks that contain caffeine. · Do not eat foods that are high in fat. · Avoid anti-inflammatory medicines such as aspirin, ibuprofen (Advil, Motrin), and naproxen (Aleve). These can cause stomach upset. Talk to your doctor if you take daily aspirin for another health problem. When should you call for help? Call 911 anytime you think you may need emergency care. For example, call if:  · You passed out (lost consciousness). · You pass maroon or very bloody stools. · You vomit blood or what looks like coffee grounds. · You have new, severe belly pain. Call your doctor now or seek immediate medical care if:  · Your pain gets worse, especially if it becomes focused in one area of your belly. · You have a new or higher fever. · Your stools are black and look like tar, or they have streaks of blood. · You have unexpected vaginal bleeding. · You have symptoms of a urinary tract infection.  These may include:  ¨ Pain when you urinate. ¨ Urinating more often than usual.  ¨ Blood in your urine. · You are dizzy or lightheaded, or you feel like you may faint. Watch closely for changes in your health, and be sure to contact your doctor if:  · You are not getting better after 1 day (24 hours). Where can you learn more? Go to http://maricruz-katy.info/. Enter K297 in the search box to learn more about \"Abdominal Pain: Care Instructions. \"  Current as of: May 27, 2016  Content Version: 11.1  © 1044-3390 [employer]. Care instructions adapted under license by [employer] (which disclaims liability or warranty for this information). If you have questions about a medical condition or this instruction, always ask your healthcare professional. [address] any warranty or liability for your use of this information. Low-Fat Diet for Gallbladder Disease: Care Instructions  Your Care Instructions  When you eat, the gallbladder releases bile, which helps you digest the fat in food. If you have an inflamed gallbladder, this may cause pain. A low-fat diet may give your gallbladder a rest so you can start to heal. Your doctor and dietitian can help you make an eating plan that does not irritate your digestive system. Always talk with your doctor or dietitian before you make changes in your diet. Follow-up care is a key part of your treatment and safety. Be sure to make and go to all appointments, and call your doctor if you are having problems. It's also a good idea to know your test results and keep a list of the medicines you take. How can you care for yourself at home? · Eat many small meals and snacks each day instead of three large meals. · Choose lean meats. ¨ Eat no more than 5 to 6½ ounces of meat a day. ¨ Cut off all fat you can see. ¨ Eat chicken and turkey without the skin.   ¨ Many types of fish, such as salmon, lake trout, tuna, and scott, provide healthy omega-3 fat. But, avoid fish canned in oil, such as sardines in olive oil. ¨ Bake, broil, or grill meats, poultry, or fish instead of frying them in butter or fat. · Drink or eat nonfat or low-fat milk, yogurt, cheese, or other milk products each day. ¨ Read the labels on cheeses, and choose those with less than 5 grams of fat an ounce. ¨ Try fat-free sour cream, cream cheese, or yogurt. ¨ Avoid cream soups and cream sauces on pasta. ¨ Eat low-fat ice cream, frozen yogurt, or sorbet. Avoid regular ice cream.  · Eat whole-grain cereals, breads, crackers, rice, or pasta. Avoid high-fat foods such as croissants, scones, biscuits, waffles, doughnuts, muffins, granola, and high-fat breads. · Flavor your foods with herbs and spices (such as basil, tarragon, or mint), fat-free sauces, or lemon juice instead of butter. You can also use butter substitutes, fat-free mayonnaise, or fat-free dressing. · Try applesauce, prune puree, or mashed bananas to replace some or all of the fat when you bake. · Limit fats and oils, such as butter, margarine, mayonnaise, and salad dressing, to no more than 1 tablespoon a meal.  · Avoid high-fat foods, such as:  ¨ Chocolate, whole milk, ice cream, and processed cheese. ¨ Fried or buttered foods. ¨ Sausage, salami, and zhu. ¨ Cinnamon rolls, cakes, pies, cookies, and other pastries. ¨ Prepared snack foods, such as potato chips, nut and granola bars, and mixed nuts. ¨ Coconut and avocado. · Learn how to read food labels for serving sizes and ingredients. Fast-food and convenience-food meals often have lots of fat. Where can you learn more? Go to http://maricruz-katy.info/. Enter L506 in the search box to learn more about \"Low-Fat Diet for Gallbladder Disease: Care Instructions. \"  Current as of: July 26, 2016  Content Version: 11.1  © 9680-4352 [employer].  Care instructions adapted under license by [facility] (which disclaims liability or warranty for this information). If you have questions about a medical condition or this instruction, always ask your healthcare professional. [address] any warranty or liability for your use of this information. We hope that we have addressed all of your medical concerns. The examination and treatment you received in the Emergency Department were for an emergent problem and were not intended as complete care. It is important that you follow up with your healthcare provider(s) for ongoing care. If your symptoms worsen or do not improve as expected, and you are unable to reach your usual health care provider(s), you should return to the Emergency Department. Today's healthcare is undergoing tremendous change, and patient satisfaction surveys are one of the many tools to assess the quality of medical care. You may receive a survey from the [employer] regarding your experience in the Emergency Department. I hope that your experience has been completely positive, particularly the medical care that I provided. As such, please participate in the survey; anything less than excellent does not meet my expectations or intentions. [facility] and [employer] participate in nationally recognized quality of care measures. If your blood pressure is greater than 120/80, as reported below, we urge that you seek medical care to address the potential of high blood pressure, commonly known as hypertension. Hypertension can be hereditary or can be caused by certain medical conditions, pain, stress, or \"white coat syndrome. \"       Please make an appointment with your health care provider(s) for follow up of your Emergency Department visit.        VITALS:   Patient Vitals for the past 8 hrs:   Temp Pulse Resp BP SpO2   02/24/17 1619 98.6 °F (37 °C) 81 18 105/67 100 %   02/24/17 1133 98 °F (36.7 °C) 77 16 113/78 94 %          Thank you for allowing us to provide you with medical care today. We realize that you have many choices for your emergency care needs. Please choose us in the future for any continued health care needs. Xavi Linda, CLARKE    [address] [facility].   Office: [phone]            Recent Results (from the past 24 hour(s))   CBC WITH AUTOMATED DIFF    Collection Time: 02/24/17 12:51 PM   Result Value Ref Range    WBC 6.5 3.6 - 11.0 K/uL    RBC 4.40 3.80 - 5.20 M/uL    HGB 13.0 11.5 - 16.0 g/dL    HCT 38.2 35.0 - 47.0 %    MCV 86.8 80.0 - 99.0 FL    MCH 29.5 26.0 - 34.0 PG    MCHC 34.0 30.0 - 36.5 g/dL    RDW 12.7 11.5 - 14.5 %    PLATELET 927 721 - 346 K/uL    NEUTROPHILS 58 32 - 75 %    LYMPHOCYTES 35 12 - 49 %    MONOCYTES 6 5 - 13 %    EOSINOPHILS 1 0 - 7 %    BASOPHILS 0 0 - 1 %    ABS. NEUTROPHILS 3.8 1.8 - 8.0 K/UL    ABS. LYMPHOCYTES 2.2 0.8 - 3.5 K/UL    ABS. MONOCYTES 0.4 0.0 - 1.0 K/UL    ABS. EOSINOPHILS 0.1 0.0 - 0.4 K/UL    ABS. BASOPHILS 0.0 0.0 - 0.1 K/UL   LIPASE    Collection Time: 02/24/17 12:51 PM   Result Value Ref Range    Lipase 123 73 - 856 U/L   METABOLIC PANEL, COMPREHENSIVE    Collection Time: 02/24/17 12:51 PM   Result Value Ref Range    Sodium 138 136 - 145 mmol/L    Potassium 3.9 3.5 - 5.1 mmol/L    Chloride 104 97 - 108 mmol/L    CO2 28 21 - 32 mmol/L    Anion gap 6 5 - 15 mmol/L    Glucose 76 65 - 100 mg/dL    BUN 7 6 - 20 MG/DL    Creatinine 0.70 0.55 - 1.02 MG/DL    BUN/Creatinine ratio 10 (L) 12 - 20      GFR est AA >60 >60 ml/min/1.73m2    GFR est non-AA >60 >60 ml/min/1.73m2    Calcium 9.6 8.5 - 10.1 MG/DL    Bilirubin, total 0.6 0.2 - 1.0 MG/DL    ALT (SGPT) 29 12 - 78 U/L    AST (SGOT) 24 15 - 37 U/L    Alk.  phosphatase 46 45 - 117 U/L    Protein, total 8.2 6.4 - 8.2 g/dL    Albumin 4.3 3.5 - 5.0 g/dL    Globulin 3.9 2.0 - 4.0 g/dL    A-G Ratio 1.1 1.1 - 2.2         Nm Hepatobiliary Duct Scan    Result Date: 2/24/2017  EXAM:  NM HEPATOBILIARY DUCT SCAN INDICATION: Right upper quadrant pain. COMPARISON:  Ultrasound 2/24/2017 CORRELATIVE IMAGING STUDIES: None. TRACER: 5.4 mCi of Tc-99m Choletec. TECHNIQUE: Following the uneventful intravenous administration of Tc 99m Choletec, imaging of the abdomen was performed in the anterior projection for 60 minutes. Delayed Tajik and right lateral views were also obtained. Following the intravenous administration of 0.83 micrograms CCK, imaging of the abdomen was performed in the anterior projection for an additional 30 minutes. Gallbladder ejection fraction was calculated. FINDINGS: The initial images demonstrate prompt hepatic tracer uptake. The common bile duct is visualized at 15 minutes. The gallbladder is visualized at 35 minutes. It demonstrates progressive filling. The duodenum is visualized at 30 minutes. Post CCK images demonstrate appropriate gallbladder contraction. Calculated ejection fraction between 0 and 30 minutes is 64%, which is normal.     IMPRESSION: Unremarkable gallbladder hepatobiliary imaging study. [address] [facility]    Result Date: 2/24/2017  INDICATION:  Right upper quadrant pain COMPARISON:  None FINDINGS: Limited right upper quadrant ultrasound was performed. The liver is normal. The common bile duct is normal measuring 2 mm in diameter. The gallbladder contains no stones or sludge. There is mild diffuse gallbladder wall thickening, up to 2.8 mm. There is generalized right upper quadrant tenderness, but the Vance's sign is absent. The right kidney measures 11.3 cm in length. There is no hydronephrosis or visible ascites. IMPRESSION: Mild diffuse gallbladder wall thickening, a nonspecific finding. No evidence of gallstones or biliary ductal dilatation.

## 2017-03-07 ENCOUNTER — OFFICE VISIT (OUTPATIENT)
Dept: SURGERY | Age: 22
End: 2017-03-07

## 2017-03-07 VITALS
OXYGEN SATURATION: 100 % | TEMPERATURE: 97.9 F | BODY MASS INDEX: 15.59 KG/M2 | HEIGHT: 63 IN | SYSTOLIC BLOOD PRESSURE: 100 MMHG | WEIGHT: 88 LBS | RESPIRATION RATE: 16 BRPM | HEART RATE: 73 BPM | DIASTOLIC BLOOD PRESSURE: 62 MMHG

## 2017-03-07 DIAGNOSIS — R10.84 GENERALIZED ABDOMINAL PAIN: Primary | ICD-10-CM

## 2017-03-07 DIAGNOSIS — R10.13 EPIGASTRIC PAIN: ICD-10-CM

## 2017-03-07 DIAGNOSIS — Z71.1 CONCERN ABOUT PEPTIC ULCER DISEASE WITHOUT DIAGNOSIS: Primary | ICD-10-CM

## 2017-03-07 RX ORDER — SUCRALFATE 1 G/10ML
1 SUSPENSION ORAL 4 TIMES DAILY
Qty: 414 ML | Refills: 2 | Status: SHIPPED | OUTPATIENT
Start: 2017-03-07 | End: 2017-12-12

## 2017-03-07 RX ORDER — PANTOPRAZOLE SODIUM 40 MG/1
40 TABLET, DELAYED RELEASE ORAL DAILY
Qty: 30 TAB | Refills: 11 | Status: SHIPPED | OUTPATIENT
Start: 2017-03-07 | End: 2017-12-12

## 2017-03-07 RX ORDER — HYDROCODONE BITARTRATE AND ACETAMINOPHEN 5; 325 MG/1; MG/1
1 TABLET ORAL
Qty: 30 TAB | Refills: 0 | Status: SHIPPED | OUTPATIENT
Start: 2017-03-07 | End: 2017-05-16 | Stop reason: ALTCHOICE

## 2017-03-07 NOTE — MR AVS SNAPSHOT
Visit Information Date & Time Provider Department Dept. Phone Encounter #  
 3/7/2017  2:00 PM Edmar MillerMinh [phone] [id number] Follow-up Instructions Return if symptoms worsen or fail to improve. Routing History Your Appointments 6/16/2017 10:00 AM  
New Patient with Jamey Mcginnis MD  
[address] [place] [address] Internal Medicine [facility] Appt Note: to get est  
 [address] [facility] [address] [facility] [address]  
[facility] [address] [address] [place] [address] Upcoming Health Maintenance Date Due  
 HPV AGE 9Y-34Y (1 of 3 - Female 3 Dose Series) 1/16/2007 DTaP/Tdap/Td series (1 - Tdap) 1/16/2017 PAP AKA CERVICAL CYTOLOGY 1/16/2017 Allergies as of 3/7/2017  Review Complete On: 3/7/2017 By: Edmar Miller MD  
  
 Severity Noted Reaction Type Reactions Latex  03/07/2017    Itching Avocado  08/14/2015    Rash Current Immunizations  Reviewed on 9/21/2016 Name Date Influenza Vaccine (Quad) PF 9/21/2016 Not reviewed this visit You Were Diagnosed With   
  
 Codes Comments Concern about peptic ulcer disease without diagnosis    -  Primary ICD-10-CM: Z71.1 ICD-9-CM: V65.5 Epigastric pain     ICD-10-CM: R10.13 ICD-9-CM: 789.06 Vitals BP Pulse Temp Resp Height(growth percentile) Weight(growth percentile) 100/62 (BP 1 Location: Left arm, BP Patient Position: Sitting) 73 97.9 °F (36.6 °C) (Oral) 16 5' 3\" (1.6 m) 88 lb (39.9 kg) LMP SpO2 BMI OB Status Smoking Status 02/21/2017 (Exact Date) 100% 15.59 kg/m2 Having regular periods Never Smoker Vitals History BMI and BSA Data Body Mass Index Body Surface Area 15.59 kg/m 2 1.33 m 2 Preferred Pharmacy Pharmacy Name Phone [facility] [phone] [facility] [phone] Your Updated Medication List  
  
   
 This list is accurate as of: 3/7/17  3:43 PM.  Always use your most recent med list.  
  
  
  
  
 HYDROcodone-acetaminophen 5-325 mg per tablet Commonly known as:  Danita Petri Take 1 Tab by mouth every four (4) hours as needed for Pain. Max Daily Amount: 6 Tabs. ondansetron 4 mg disintegrating tablet Commonly known as:  ZOFRAN ODT Take 1 Tab by mouth every eight (8) hours as needed for Nausea. pantoprazole 40 mg tablet Commonly known as:  PROTONIX Take 1 Tab by mouth daily. sucralfate 100 mg/mL suspension Commonly known as:  Adah Dory Take 10 mL by mouth four (4) times daily. Prescriptions Printed Refills HYDROcodone-acetaminophen (NORCO) 5-325 mg per tablet 0 Sig: Take 1 Tab by mouth every four (4) hours as needed for Pain. Max Daily Amount: 6 Tabs. Class: Print Route: Oral  
  
Prescriptions Sent to Pharmacy Refills  
 pantoprazole (PROTONIX) 40 mg tablet 11 Sig: Take 1 Tab by mouth daily. Class: Normal  
 Pharmacy: [facility] [address] [place] [address] Ph #: [phone] Route: Oral  
 sucralfate (CARAFATE) 100 mg/mL suspension 2 Sig: Take 10 mL by mouth four (4) times daily. Class: Normal  
 Pharmacy: [address] [place] [address] Ph #: [phone] Route: Oral  
  
We Performed the Following H. PYLORI BREATH TEST [20220 CPT(R)] REFERRAL TO GASTROENTEROLOGY [XHY66 Custom] Comments:  
 Clinical exam and history concerning for peptic ulcer disease. Please consider EGD. Follow-up Instructions Return if symptoms worsen or fail to improve. To-Do List   
 03/08/2017 1:30 PM  
  Appointment with [facility] ER 3 at [facility] [address] ([phone]) CONTRAST STUDY: 1. The patient should not eat solid food four hours before the appointment but should be encouraged to drink clear liquids.  2.  If you have to drink oral contrast, please pick it up any weekday prior to your appointment, if you cannot please check in 2 hrs before appt time. 3.  The patient will require IV access for contrast administration. 4.  The patient should not take Ibuprofen (Advil, Motrin, etc.) and Naproxen Sodium (Aleve, etc.)  on the day of the exam. Stopping non-steroidal anti-inflammatory agents (NSAIDs) like Ibuprofen decreases the risk of kidney damage from the x-ray contrast (dye). 5.  Bring any non [facility] facility films/images pertaining to the area of interest with you on the day of appointment. 6.  Bring current lab work if available (within last 90 days [facility]) ***If scheduled at [facility], iSTAT is not available, labs will need to be done before appointment*** 7. Check in at registration at least 30 minutes before appt time unless you were instructed to do otherwise. Referral Information Referral ID Referred By Referred To  
  
 [id number] [place], [address]   
   [address] [facility] [address] [facility], [address] [facility] Visits Status Start Date End Date 1 New Request 3/7/17 3/7/18 If your referral has a status of pending review or denied, additional information will be sent to support the outcome of this decision. Introducing [facility]! Miriam Bowden introduces [employer] patient portal. Now you can access parts of your medical record, email your doctor's office, and request medication refills online. 1. In your internet browser, go to [email or web address] 2. Click on the First Time User? Click Here link in the Sign In box. You will see the New Member Sign Up page. 3. Enter your [employer] Access Code exactly as it appears below. You will not need to use this code after youve completed the sign-up process. If you do not sign up before the expiration date, you must request a new code. · [employer] Access Code: [email or web address] Expires: 5/21/2017  3:05 PM 
 
 4. Enter the last four digits of your Social Security Number (xxxx) and Date of Birth (mm/dd/yyyy) as indicated and click Submit. You will be taken to the next sign-up page. 5. Create a [employer] ID. This will be your [employer] login ID and cannot be changed, so think of one that is secure and easy to remember. 6. Create a [employer] password. You can change your password at any time. 7. Enter your Password Reset Question and Answer. This can be used at a later time if you forget your password. 8. Enter your e-mail address. You will receive e-mail notification when new information is available in [address] 9. Click Sign Up. You can now view and download portions of your medical record. 10. Click the Download Summary menu link to download a portable copy of your medical information. If you have questions, please visit the Frequently Asked Questions section of the [employer] website. Remember, [employer] is NOT to be used for urgent needs. For medical emergencies, dial 911. Now available from your iPhone and Android! Please provide this summary of care documentation to your next provider. Your primary care clinician is listed as Neha Foreman. If you have any questions after today's visit, please call [phone].

## 2017-03-07 NOTE — PROGRESS NOTES
[facility] Surgery History and Physical      Chief Complaint: Recurrent epigastric pain    History of Present Illness:      Naresh Wall is a 25 y.o. female who was previously seen in the ER but presents to the office today due to recurrent epigastric and RUQ abdominal pain. She states this recurred 2-3 days ago and has been constant and burning in nature. She has had some nausea/vomiting also associated with this. She was seen in the ER for the same symptoms and had a workup including labs, RUQ U/S and a HIDA scan that were all normal.  We were planning to get a CT but her pain resolved and she went home. She states she intermittently has taken some pepcid but was off of this for a week or more recently until taking a dose this morning. She denies any changes to her bowel habits, including no constipation/diarrhea, no melena or bloody stools. She denies hematemesis. She did take high dose ibuprofen for about a week due to some headaches recently. She has never had prior endoscopies or surgeries in the past.      Past Medical History:   Diagnosis Date    Anemia     Ovarian cyst 2016       History reviewed. No pertinent surgical history. Current Outpatient Prescriptions:     pantoprazole (PROTONIX) 40 mg tablet, Take 1 Tab by mouth daily. , Disp: 30 Tab, Rfl: 11    sucralfate (CARAFATE) 100 mg/mL suspension, Take 10 mL by mouth four (4) times daily. , Disp: 414 mL, Rfl: 2    HYDROcodone-acetaminophen (NORCO) 5-325 mg per tablet, Take 1 Tab by mouth every four (4) hours as needed for Pain.  Max Daily Amount: 6 Tabs., Disp: 30 Tab, Rfl: 0    ondansetron (ZOFRAN ODT) 4 mg disintegrating tablet, Take 1 Tab by mouth every eight (8) hours as needed for Nausea., Disp: 10 Tab, Rfl: 0    Allergies   Allergen Reactions    Latex Itching    Avocado Rash       Social History     Social History    Marital status:      Spouse name: N/A    Number of children: N/A    Years of education: N/A Occupational History    Not on file. Social History Main Topics    Smoking status: Never Smoker    Smokeless tobacco: Never Used    Alcohol use No    Drug use: No    Sexual activity: No      Comment: single no children     Other Topics Concern    Not on file     Social History Narrative       History reviewed. No pertinent family history. ROS   Constitutional: negative, reports 4 lb weight loss in last few weeks. Ears, Nose, Mouth, Throat, and Face: negative  Respiratory: negative  Cardiovascular: negative  Gastrointestinal: See HPI  Genitourinary:negative  Integument/Breast: negative  Hematologic/Lymphatic: negative  Behavioral/Psychiatric: negative  Allergic/Immunologic: negative      Physical Exam:     Visit Vitals    /62 (BP 1 Location: Left arm, BP Patient Position: Sitting)    Pulse 73    Temp 97.9 °F (36.6 °C) (Oral)    Resp 16    Ht 5' 3\" (1.6 m)    Wt 88 lb (39.9 kg)    SpO2 100%    BMI 15.59 kg/m2       General - alert and oriented, no apparent distress  HEENT - NC/AT. No scleral icterus  Pulm - CTAB, normal inspiratory effort  CV - RRR, no M/R/G  Abd - soft, nondistended. No palpable masses or organomegaly. TTP in epigastrium and RUQ. No rebound or guarding.  + BS. No evidence of hernia. Ext - warm, well perfused, no edema  Lymphatics - no cervical, supraclavicular or inguinal adenopathy. Skin - supple, no rashes  Psychiatric - normal affect, good mood        Assessment:     Stephanie Daniels is a 25 y.o. female with epigastric and RUQ pain. She has undergone an extensive workup to rule out her gallbladder as a cause of her pain. Based on her history, symptoms and the location of her pain, this is most consistent with peptic ulcer disease in my opinion. Recommendations:     1. I will send her for a CT scan to ensure that there is not some other cause for her pain that would be more concerning. I will plan to call her with these results.    2.   I will start her on protonix, carafate and stop her pepcid at this time for concern of PUD. 3.   Check urea breath test for H. Pylori  4. Referral to GI for consideration of EGD  5. I will give her some pain medication due to her abdominal pain. Hopefully this will improve with protonix and carafate over the next several days. Manuel Grayson MD  3/7/2017    20 mins of time was spent with the patient of which > 50% of the time involved face-to-face counseling of the patient regarding the proposed treatment plan.         CC: Aracely Tovar MD

## 2017-03-07 NOTE — PROGRESS NOTES
1. Have you been to the ER, urgent care clinic since your last visit? Hospitalized since your last visit? No       2. Have you seen or consulted any other health care providers outside of the [address] [place] [address] since your last visit? Include any pap smears or colon screening.   No

## 2017-03-08 ENCOUNTER — HOSPITAL ENCOUNTER (OUTPATIENT)
Dept: CT IMAGING | Age: 22
Discharge: HOME | End: 2017-03-08
Attending: SURGERY
Payer: COMMERCIAL

## 2017-03-08 ENCOUNTER — TELEPHONE (OUTPATIENT)
Dept: SURGERY | Age: 22
End: 2017-03-08

## 2017-03-08 DIAGNOSIS — R10.84 GENERALIZED ABDOMINAL PAIN: ICD-10-CM

## 2017-03-08 PROCEDURE — 74177 CT ABD & PELVIS W/CONTRAST: CPT

## 2017-03-08 PROCEDURE — 74011636320 HC RX REV CODE- 636/320: Performed by: SURGERY

## 2017-03-08 PROCEDURE — 74011000258 HC RX REV CODE- 258: Performed by: SURGERY

## 2017-03-08 RX ORDER — SODIUM CHLORIDE 0.9 % (FLUSH) 0.9 %
10 SYRINGE (ML) INJECTION
Status: COMPLETED | OUTPATIENT
Start: 2017-03-08 | End: 2017-03-08

## 2017-03-08 RX ADMIN — SODIUM CHLORIDE 100 ML: 900 INJECTION, SOLUTION INTRAVENOUS at 14:07

## 2017-03-08 RX ADMIN — IOPAMIDOL 100 ML: 755 INJECTION, SOLUTION INTRAVENOUS at 14:07

## 2017-03-08 RX ADMIN — Medication 10 ML: at 14:07

## 2017-03-08 RX ADMIN — IOHEXOL 50 ML: 240 INJECTION, SOLUTION INTRATHECAL; INTRAVASCULAR; INTRAVENOUS; ORAL at 14:07

## 2017-03-08 NOTE — TELEPHONE ENCOUNTER
Patient's meds are not working for her pain. She's been constipated and experiencing vomiting as well.

## 2017-03-08 NOTE — TELEPHONE ENCOUNTER
Patient called, complaining of sever pain. Wanted to know if Dr. Chitra Ramirez can give her \"something stronger for pain\" spoke with Dr. Chitra Ramirez, will have a prescription available for patient tomorrow.

## 2017-03-09 ENCOUNTER — TELEPHONE (OUTPATIENT)
Dept: SURGERY | Age: 22
End: 2017-03-09

## 2017-03-10 ENCOUNTER — TELEPHONE (OUTPATIENT)
Dept: SURGERY | Age: 22
End: 2017-03-10

## 2017-03-15 LAB — UREA BREATH TEST QL: NEGATIVE

## 2017-05-16 ENCOUNTER — OFFICE VISIT (OUTPATIENT)
Dept: FAMILY MEDICINE CLINIC | Age: 22
End: 2017-05-16

## 2017-05-16 VITALS
HEART RATE: 102 BPM | OXYGEN SATURATION: 99 % | SYSTOLIC BLOOD PRESSURE: 103 MMHG | TEMPERATURE: 98.1 F | DIASTOLIC BLOOD PRESSURE: 64 MMHG | RESPIRATION RATE: 18 BRPM | WEIGHT: 95 LBS | BODY MASS INDEX: 16.83 KG/M2 | HEIGHT: 63 IN

## 2017-05-16 DIAGNOSIS — J02.9 SORE THROAT: ICD-10-CM

## 2017-05-16 DIAGNOSIS — J02.9 PHARYNGITIS, UNSPECIFIED ETIOLOGY: Primary | ICD-10-CM

## 2017-05-16 LAB
S PYO AG THROAT QL: NEGATIVE
VALID INTERNAL CONTROL?: YES

## 2017-05-16 RX ORDER — LEVONORGESTREL AND ETHINYL ESTRADIOL 0.15-0.03
KIT ORAL
COMMUNITY
End: 2021-12-21 | Stop reason: ALTCHOICE

## 2017-05-16 NOTE — PROGRESS NOTES
HISTORY OF PRESENT ILLNESS  Augusta Wong is a 25 y.o. female. HPI   This 25year old complains of a 2-3 day hx of sore throat. No fever  No other complaints    Review of Systems   Constitutional: Negative for chills and fever. HENT: Positive for sore throat. Negative for congestion. Respiratory: Negative for cough. Cardiovascular: Negative for chest pain. Gastrointestinal: Negative for abdominal pain and vomiting. Musculoskeletal: Negative for myalgias. Physical Exam   Constitutional: She appears well-developed and well-nourished. No distress. HENT:   Right Ear: External ear normal.   Left Ear: External ear normal.   Nose: Nose normal.   Mouth/Throat: Oropharynx is clear and moist. No oropharyngeal exudate. Erythematous posterior pharynx-left   Eyes: Pupils are equal, round, and reactive to light. Neck: Normal range of motion. Neck supple. Cardiovascular: Normal rate, regular rhythm and normal heart sounds. No murmur heard. Pulmonary/Chest: Effort normal and breath sounds normal. No respiratory distress. She has no wheezes. She has no rales. Abdominal: Soft. There is no tenderness. Skin: She is not diaphoretic. ASSESSMENT and PLAN    ICD-10-CM ICD-9-CM    1. Pharyngitis, unspecified etiology J02.9 462    2.  Sore throat J02.9 462 AMB POC RAPID STREP A   strep screen negative  Zinc lozenges  Precautions given  Follow up if not better

## 2017-08-29 ENCOUNTER — HOSPITAL ENCOUNTER (OUTPATIENT)
Dept: NUCLEAR MEDICINE | Age: 22
Discharge: HOME | End: 2017-08-29
Attending: INTERNAL MEDICINE
Payer: COMMERCIAL

## 2017-08-29 DIAGNOSIS — R10.9 ABDOMINAL PAIN: ICD-10-CM

## 2017-08-29 PROCEDURE — 78264 GASTRIC EMPTYING IMG STUDY: CPT

## 2017-11-11 LAB — CREATININE, EXTERNAL: 0.58

## 2017-11-23 LAB — HBA1C MFR BLD HPLC: 5.3 %

## 2017-12-12 ENCOUNTER — OFFICE VISIT (OUTPATIENT)
Dept: INTERNAL MEDICINE CLINIC | Age: 22
End: 2017-12-12

## 2017-12-12 VITALS
WEIGHT: 93.4 LBS | HEIGHT: 63 IN | OXYGEN SATURATION: 98 % | DIASTOLIC BLOOD PRESSURE: 60 MMHG | SYSTOLIC BLOOD PRESSURE: 90 MMHG | BODY MASS INDEX: 16.55 KG/M2 | HEART RATE: 87 BPM | TEMPERATURE: 98.6 F | RESPIRATION RATE: 16 BRPM

## 2017-12-12 DIAGNOSIS — Z00.00 ROUTINE GENERAL MEDICAL EXAMINATION AT A HEALTH CARE FACILITY: Primary | ICD-10-CM

## 2017-12-12 DIAGNOSIS — L30.9 DERMATITIS: ICD-10-CM

## 2017-12-12 DIAGNOSIS — Z91.018 FOOD ALLERGY: ICD-10-CM

## 2017-12-12 DIAGNOSIS — R63.6 UNDERWEIGHT DUE TO INADEQUATE CALORIC INTAKE: ICD-10-CM

## 2017-12-12 DIAGNOSIS — K31.84 GASTROPARESIS: ICD-10-CM

## 2017-12-12 DIAGNOSIS — K64.0 GRADE I HEMORRHOIDS: ICD-10-CM

## 2017-12-12 PROBLEM — R10.13 EPIGASTRIC PAIN: Status: RESOLVED | Noted: 2017-03-07 | Resolved: 2017-12-12

## 2017-12-12 RX ORDER — HYDROCORTISONE 25 MG/G
CREAM TOPICAL
Qty: 30 G | Refills: 0 | Status: SHIPPED | OUTPATIENT
Start: 2017-12-12 | End: 2018-12-30

## 2017-12-12 NOTE — PROGRESS NOTES
No chief complaint on file. 1. Have you been to the ER, urgent care clinic since your last visit? Hospitalized since your last visit? No    2. Have you seen or consulted any other health care providers outside of the [address] [place] [address] since your last visit? Include any pap smears or colon screening.  No

## 2017-12-12 NOTE — MR AVS SNAPSHOT
Visit Information Date & Time Provider Department Dept. Phone Encounter #  
 12/12/2017 10:00 AM Jules Maldonado, [address] [facility] [phone] [id number] Follow-up Instructions Return in about 1 year (around 12/12/2018) for physical.  
  
Upcoming Health Maintenance Date Due  
 HPV AGE 9Y-34Y (1 of 3 - Female 3 Dose Series) 1/16/2007 PAP AKA CERVICAL CYTOLOGY 1/16/2017 DTaP/Tdap/Td series (2 - Td) 1/16/2018 Allergies as of 12/12/2017  Review Complete On: 12/12/2017 By: Jules Maldonado MD  
  
 Severity Noted Reaction Type Reactions Latex  03/07/2017    Itching Avocado  08/14/2015    Rash Current Immunizations  Reviewed on 12/12/2017 Name Date Influenza Nasal Vaccine 10/31/2017 Influenza Vaccine (Quad) PF 9/21/2016 Tdap 1/16/2008 Reviewed by Jules Maldonado MD on 12/12/2017 at 10:19 AM  
You Were Diagnosed With   
  
 Codes Comments Routine general medical examination at a health care facility    -  Primary ICD-10-CM: Z00.00 ICD-9-CM: V70.0 Gastroparesis     ICD-10-CM: K31.84 ICD-9-CM: 536.3 Dermatitis     ICD-10-CM: L30.9 ICD-9-CM: 692.9 Food allergy     ICD-10-CM: D59.560 
ICD-9-CM: V15.05 Vitals BP Pulse Temp Resp Height(growth percentile) Weight(growth percentile) 90/60 87 98.6 °F (37 °C) (Oral) 16 5' 3\" (1.6 m) 93 lb 6.4 oz (42.4 kg) LMP SpO2 BMI OB Status Smoking Status 11/15/2017 98% 16.55 kg/m2 Having regular periods Never Smoker BMI and BSA Data Body Mass Index Body Surface Area  
 16.55 kg/m 2 1.37 m 2 Preferred Pharmacy Pharmacy Name Phone [facility] [address] [facility] [phone] Your Updated Medication List  
  
   
This list is accurate as of: 12/12/17 10:50 AM.  Always use your most recent med list.  
  
  
  
  
 human papillomavirus vaccine QV (PF) 20-40-40-20 mcg/0.5 mL injection Commonly known as:  GARDASIL (PF)  
0.5 mL IM at 0, 1, and 6 months  
  
 hydrocortisone 2.5 % rectal cream  
Commonly known as:  ANUSOL-HC Apply to rectum 4 x a day As needed for hemorrhoids. LEVORA-28 0.15-0.03 mg Tab Generic drug:  levonorgestrel-ethinyl estradiol Take  by mouth.  
  
 pantoprazole 40 mg tablet Commonly known as:  PROTONIX Take 1 Tab by mouth daily. sucralfate 100 mg/mL suspension Commonly known as:  Gladystine Blessing Take 10 mL by mouth four (4) times daily. Prescriptions Printed Refills  
 human papillomavirus vaccine QV, PF, (GARDASIL, PF,) 20-40-40-20 mcg/0.5 mL injection 2 Si.5 mL IM at 0, 1, and 6 months Class: Print Prescriptions Sent to Pharmacy Refills  
 hydrocortisone (ANUSOL-HC) 2.5 % rectal cream 0 Sig: Apply to rectum 4 x a day As needed for hemorrhoids. Class: Normal  
 Pharmacy: [place] [address], [address] [facility] #: [phone] We Performed the Following LIPID PANEL [73058 CPT(R)] REFERRAL TO ALLERGY [REF5 Custom] Follow-up Instructions Return in about 1 year (around 2018) for physical.  
  
  
Referral Information Referral ID Referred By Referred To  
  
 [id number] Nayely JACOBSON [facility] [address] [place], [address] [facility] Visits Status Start Date End Date 1 New Request 17 If your referral has a status of pending review or denied, additional information will be sent to support the outcome of this decision. Introducing [facility]! Dear Andrew Brown: Thank you for requesting a [employer] account. Our records indicate that you already have an active [employer] account. You can access your account anytime at [email or web address] Did you know that you can access your hospital and ER discharge instructions at any time in [facility]? You can also review all of your test results from your hospital stay or ER visit. Additional Information If you have questions, please visit the Frequently Asked Questions section of the [facility] website at [email or web address] Remember, [facility] is NOT to be used for urgent needs. For medical emergencies, dial 911. Now available from your iPhone and Android! Please provide this summary of care documentation to your next provider. Your primary care clinician is listed as Michelle Bound. If you have any questions after today's visit, please call [phone].

## 2017-12-12 NOTE — PROGRESS NOTES
HPI:  Pollo Astorga is a 25y.o. year old female who is a new patient and is here to establish care. She was previous followed by NP Lalo Cox. The following sections were reviewed & updated as appropriate: PMH, PL, PSH, and SH. She sees a gyn and Will request records. Active medical concerns are as follows:    1. She has recently been diagnosed with gastroparesis. She has had EGD and gastric emptying test which was abnormal. She has lost 10 pounds over the past year. She is drinking 1-2 cans of ensure daily. 2.  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 She requests referral to an allergist and is also concerned about food allergies. States she is not able to eat avocados. 3.  Hemorrhoids: She states she has on and off burning and irritation in her rectal area from hemorrhoids. No rectal bleeding. He would like medication for this and states she has tried over-the-counter medications. 4.  She complains of circular areas of rash on and off and currently has one on her anterior thigh that she is treating with Lamisil is almost resolved. Current Outpatient Prescriptions   Medication Sig Dispense Refill    levonorgestrel-ethinyl estradiol (LEVORA-28) 0.15-0.03 mg tab Take  by mouth. Allergies   Allergen Reactions    Latex Itching    Avocado Rash     Past Medical History:   Diagnosis Date    Anemia     Gastroparesis     Ovarian cyst 2016     History reviewed. No pertinent surgical history. History reviewed. No pertinent family history. Social History   Substance Use Topics    Smoking status: Never Smoker    Smokeless tobacco: Never Used    Alcohol use No             Review of Systems   Constitutional: Negative for chills, fever and malaise/fatigue. HENT: Negative for congestion, ear pain, hearing loss and sore throat. Eyes: Negative for blurred vision and double vision.    Respiratory: Negative for cough, shortness of breath and wheezing. Cardiovascular: Negative for chest pain, palpitations and leg swelling. Gastrointestinal: Positive for abdominal pain (on and off upper abdomin pain daily. ). Negative for blood in stool, constipation, diarrhea, heartburn, nausea and vomiting. Genitourinary: Negative for dysuria, frequency and urgency. Musculoskeletal: Negative for back pain, joint pain and myalgias. Skin: Negative for rash. Neurological: Negative for dizziness, sensory change, focal weakness and headaches. Psychiatric/Behavioral: Negative for depression. The patient is not nervous/anxious and does not have insomnia. Physical Exam   Constitutional: She appears well-developed. No distress. BP 90/60  Pulse 87  Temp 98.6 °F (37 °C) (Oral)   Resp 16  Ht 5' 3\" (1.6 m)  Wt 93 lb 6.4 oz (42.4 kg)  LMP 11/15/2017  SpO2 98%  BMI 16.55 kg/m2   HENT:   Head: Normocephalic and atraumatic. Right Ear: Tympanic membrane and ear canal normal.   Left Ear: Tympanic membrane and ear canal normal.   Nose: Nose normal.   Mouth/Throat: Oropharynx is clear and moist.   Eyes: Conjunctivae and EOM are normal. Pupils are equal, round, and reactive to light. Neck: Normal range of motion. No thyromegaly present. Cardiovascular: Normal rate, regular rhythm, normal heart sounds and intact distal pulses. No murmur heard. Pulmonary/Chest: Effort normal and breath sounds normal.   Abdominal: Soft. Bowel sounds are normal. She exhibits no mass. There is tenderness. Genitourinary:   Genitourinary Comments: Rectal: Small external hemorrhoid noted mildly tender to palpation no thrombosis, no bleeding. Rectal vault nontender no masses heme-negative. Musculoskeletal: She exhibits no edema or tenderness. Lymphadenopathy:     She has no cervical adenopathy. Neurological: She has normal strength. No cranial nerve deficit or sensory deficit. Skin: No rash noted.    Psychiatric: She has a normal mood and affect. Vitals reviewed. Assessment & Plan:    ICD-10-CM ICD-9-CM    1. Routine general medical examination at a health care facility  Counseled continue healthy lifestyle, exercise, diet and weight. Z00.00 V70.0 LIPID PANEL   2. Gastroparesis with significant symptoms of abdominal pain. Recommend she try a trial of Reglan as recommended by her GI specialist.  Anselmo Sherlyn that she could try it short-term to see how she does understand her concerns about long-term use. K31.84 536.3    3. Dermatitis  Refer to dermatology. L30.9 692.9    4. Food allergy  Referred to allergist. Z91.018 V15.05 REFERRAL TO ALLERGY      FOOD ALLERGY PROFILE   5. Underweight due to inadequate caloric intake  Counseled regarding increasing caloric intake using supplements such as Ensure. R63.6 783.22    6. External hemorrhoid  Anusol HC topical cream apply 4 times a day as needed for flares. Counseled regarding hemorrhoids. Follow-up Disposition:  Return in about 1 year (around 12/12/2018) for physical.   Osman Body her to call back or return to office if symptoms worsen/change/persist.  Discussed expected course/resolution/complications of diagnosis in detail with patient. Medication risks/benefits/costs/interactions/alternatives discussed with patient. She was given an after visit summary which includes diagnoses, current medications, & vitals. She expressed understanding with the diagnosis and plan.

## 2017-12-21 LAB
CHOLEST SERPL-MCNC: 163 MG/DL (ref 100–199)
HDLC SERPL-MCNC: 61 MG/DL
LDLC SERPL CALC-MCNC: 90 MG/DL (ref 0–99)
TRIGL SERPL-MCNC: 62 MG/DL (ref 0–149)
VLDLC SERPL CALC-MCNC: 12 MG/DL (ref 5–40)

## 2017-12-24 LAB
CLAM IGE QN: <0.1 KU/L
CODFISH IGE QN: <0.1 KU/L
CORN IGE QN: <0.1 KU/L
COW MILK IGE QN: <0.1 KU/L
EGG WHITE IGE QN: <0.1 KU/L
Lab: ABNORMAL
PEANUT IGE QN: <0.1 KU/L
SCALLOP IGE QN: <0.1 KU/L
SESAME SEED IGE QN: 0.11 KU/L
SHRIMP IGE QN: <0.1 KU/L
SOYBEAN IGE QN: <0.1 KU/L
WALNUT IGE QN: <0.1 KU/L
WHEAT IGE QN: <0.1 KU/L

## 2017-12-27 ENCOUNTER — TELEPHONE (OUTPATIENT)
Dept: INTERNAL MEDICINE CLINIC | Age: 22
End: 2017-12-27

## 2017-12-27 NOTE — TELEPHONE ENCOUNTER
Called pt and informed her to call [facility] they should provide the Guardasil. Pt verbalized understanding.

## 2017-12-27 NOTE — TELEPHONE ENCOUNTER
STD vaccine - [facility] is telling Pt they are not authorized to give this vaccine.   Advise want to do - [phone]

## 2017-12-27 NOTE — TELEPHONE ENCOUNTER
Vaccine - Pt cannot find a Pharm that carries the script for Vaccine Dr Sonia Amin wrote script for. Please advise where Pt can get this Vaccine.   Pt - [phone]

## 2017-12-28 NOTE — TELEPHONE ENCOUNTER
Called pt and notified her that we spoke with the pharmacist at [address] [place] [address] and they stated they can fill the guardasil prescription and administer it. Pt verbalized understanding.

## 2018-01-09 ENCOUNTER — OFFICE VISIT (OUTPATIENT)
Dept: FAMILY MEDICINE CLINIC | Age: 23
End: 2018-01-09

## 2018-01-09 VITALS
RESPIRATION RATE: 18 BRPM | WEIGHT: 96 LBS | HEART RATE: 102 BPM | OXYGEN SATURATION: 99 % | BODY MASS INDEX: 17.01 KG/M2

## 2018-01-09 DIAGNOSIS — K31.84 GASTROPARESIS: ICD-10-CM

## 2018-01-09 DIAGNOSIS — F41.8 ANXIETY ABOUT HEALTH: Primary | ICD-10-CM

## 2018-01-09 DIAGNOSIS — K21.9 GASTROESOPHAGEAL REFLUX DISEASE, ESOPHAGITIS PRESENCE NOT SPECIFIED: ICD-10-CM

## 2018-01-09 RX ORDER — PANTOPRAZOLE SODIUM 20 MG/1
20 TABLET, DELAYED RELEASE ORAL DAILY
COMMUNITY
End: 2018-12-30 | Stop reason: SDUPTHER

## 2018-01-09 RX ORDER — METOCLOPRAMIDE 10 MG/1
10 TABLET ORAL
COMMUNITY
End: 2020-09-08

## 2018-01-09 NOTE — PATIENT INSTRUCTIONS

## 2018-01-09 NOTE — PROGRESS NOTES
Subjective:      Cristy Sinha is an 25 y.o. female who presents for evaluation of gastroesophageal reflux with nocturnal burning, shortness of breath, symptoms occur at night, symptoms primarily relate to meals, and lying down after meals and upper abdominal discomfort. She denies dysphagia. She  has not lost weight. She denies melena, hematochezia, hematemesis, and coffee ground emesis. She denies abdominal bloating, belching and eructation, chest pain, cough, deep pressure at base of neck and nausea. Medical therapy in the past has included proton pump inhibitors, antacids, Protonix, Reglan. She is here today with her spouse. She was anxious and guarded at the start of our visit. Her spouse remained in the room during her visit. He seems to be very supportive. He is currently in Nursing school, working as a patient care tech. 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 She is quite tearful and anxious currently due to an exacerbation of GI symptoms, which have been diagnosed over this past year as Gastroparesis. She has learned to manage her diet, and is apparently very meticulous with dietary restrictions and timing of meals to avoid eating within 4 hours of bedtime. She is on Reglan, which she feels is helping. She has a custom of sleeping flat, without pillows, and I have suggested that she obtain a wedge or recliner for when she is having a GI flare-up. She stopped taking Prilosec a few months ago, and did not consider restarting this with recent increase in GERD, painful abdomen, diarrhea. She has also started to have episodes where she wakes up with a feeling that she can not catch her breath. She becomes very anxious, and after sitting upright for some time, she then feels better and can return to sleep. This has started within the past few nights. She denies any URI symptoms, other than a dry cough. She has no mucous or sore throat. She is not using a humidifier. She does have an appointment with an allergist for allergy testing within the next few days. Problem List:     Patient Active Problem List    Diagnosis Date Noted    Gastroparesis 12/12/2017    Underweight due to inadequate caloric intake 12/12/2017     Medical History:     Past Medical History:   Diagnosis Date    Anemia     Gastroparesis     Gastroparesis 12/12/2017    Ovarian cyst 2016     Allergies: Allergies   Allergen Reactions    Latex Itching    Avocado Rash      Medications:     Current Outpatient Prescriptions   Medication Sig    human papillomavirus vaccine QV, PF, (GARDASIL, PF,) 20-40-40-20 mcg/0.5 mL injection 0.5 mL IM at 0, 1, and 6 months    hydrocortisone (ANUSOL-HC) 2.5 % rectal cream Apply to rectum 4 x a day As needed for hemorrhoids.  levonorgestrel-ethinyl estradiol (LEVORA-28) 0.15-0.03 mg tab Take  by mouth. No current facility-administered medications for this visit. Surgical History:   No past surgical history on file. Social History:     Social History     Social History    Marital status:      Spouse name: N/A    Number of children: N/A    Years of education: N/A     Social History Main Topics    Smoking status: Never Smoker    Smokeless tobacco: Never Used    Alcohol use No    Drug use: No    Sexual activity: No      Comment: single no children     Other Topics Concern    Not on file     Social History Narrative       Review of Systems  A comprehensive review of systems was negative except for that written in the HPI. Objective: There were no vitals taken for this visit. General:  alert, cooperative, mild distress, appears stated age   Skin:  Normal.   Lungs:  clear to auscultation bilaterally   Heart:  regular rate and rhythm, S1, S2 normal, no murmur, click, rub or gallop   Abdomen: soft, non-tender.  Bowel sounds normal. No masses,  no organomegaly   Extremities:  extremities normal, atraumatic, no cyanosis or edema     Assessment/ Plan: ICD-10-CM ICD-9-CM    1. Anxiety about health F41.8 300.09    2. Gastroesophageal reflux disease, esophagitis presence not specified K21.9 530.81    3. Gastroparesis K31.84 536.3    . Nonpharmacologic treatments were discussed including: eating smaller meals, elevation of the head of bed, wedge pillow or recliner at night, avoidance of caffeine, chocolate, nicotine and peppermint, avoiding tight fitting clothing. I have suggested a cool mist humidifier, anti-acids to keep on hand, restarting Protonix or Omeprazole daily until this has improved. I have encouraged her to try some relaxation strategies such as meditation before bedtime, avoid the scale as she is also very anxious about her weight (she is underweight and supplements meals with 1 can of ensure daily). High protein, nutrient dense, small frequent meals . Education and long term management of this condition was encouraged. Anxiety and stress management. If she has any acute Shortness of breath, chest pain, or other emergent symptoms, she will go to the ED for further treatment as needed.

## 2018-01-12 ENCOUNTER — TELEPHONE (OUTPATIENT)
Dept: INTERNAL MEDICINE CLINIC | Age: 23
End: 2018-01-12

## 2018-01-12 NOTE — TELEPHONE ENCOUNTER
Called pt and informed her that she would have to make an appt to discuss her issues with food allergies. Pt stated she cannot afford to come in at this time. Pt stated she would communicate through my chart with Dr Bjorn Vela.

## 2018-01-17 ENCOUNTER — TELEPHONE (OUTPATIENT)
Dept: INTERNAL MEDICINE CLINIC | Age: 23
End: 2018-01-17

## 2018-01-17 NOTE — TELEPHONE ENCOUNTER
----- Message from Chantel Arteaga LPN sent at 1/12/3804  3:22 PM EST -----  Regarding: FW: Non-Urgent Medical Question  Contact: [phone]      ----- Message -----     From: Zachery Vazquez     Sent: 1/12/2018  11:49 AM       To: Sam Noble Bethune  Subject: Non-Urgent Medical Question                      Vin Cortés,  I hope you are doing well. I called your office this morning to discuss about my food allergies. Found so many things that I am allergic to. Two that concerns me is Wheat, and Rye, which is related to Celiac Disease. Please get back to me about this, I want to possibly get tested for Celiac Disease. Thank you so much. I can be reached at [phone] or [email or web address].      Sincerely,  Zachery Vazquez

## 2018-05-01 ENCOUNTER — HOSPITAL ENCOUNTER (EMERGENCY)
Age: 23
Discharge: HOME | End: 2018-05-01
Attending: EMERGENCY MEDICINE
Payer: COMMERCIAL

## 2018-05-01 VITALS
TEMPERATURE: 98.1 F | HEART RATE: 90 BPM | RESPIRATION RATE: 16 BRPM | OXYGEN SATURATION: 100 % | SYSTOLIC BLOOD PRESSURE: 109 MMHG | DIASTOLIC BLOOD PRESSURE: 66 MMHG

## 2018-05-01 DIAGNOSIS — K31.84 GASTROPARESIS: Primary | ICD-10-CM

## 2018-05-01 LAB
ALBUMIN SERPL-MCNC: 3.9 G/DL (ref 3.5–5)
ALBUMIN/GLOB SERPL: 1.1 {RATIO} (ref 1.1–2.2)
ALP SERPL-CCNC: 46 U/L (ref 45–117)
ALT SERPL-CCNC: 23 U/L (ref 12–78)
ANION GAP SERPL CALC-SCNC: 7 MMOL/L (ref 5–15)
AST SERPL-CCNC: 18 U/L (ref 15–37)
BASOPHILS # BLD: 0 K/UL (ref 0–0.1)
BASOPHILS NFR BLD: 1 % (ref 0–1)
BILIRUB SERPL-MCNC: 0.4 MG/DL (ref 0.2–1)
BUN SERPL-MCNC: 5 MG/DL (ref 6–20)
BUN/CREAT SERPL: 8 (ref 12–20)
CALCIUM SERPL-MCNC: 9.2 MG/DL (ref 8.5–10.1)
CHLORIDE SERPL-SCNC: 112 MMOL/L (ref 97–108)
CO2 SERPL-SCNC: 23 MMOL/L (ref 21–32)
CREAT SERPL-MCNC: 0.64 MG/DL (ref 0.55–1.02)
DIFFERENTIAL METHOD BLD: NORMAL
EOSINOPHIL # BLD: 0 K/UL (ref 0–0.4)
EOSINOPHIL NFR BLD: 0 % (ref 0–7)
ERYTHROCYTE [DISTWIDTH] IN BLOOD BY AUTOMATED COUNT: 13.2 % (ref 11.5–14.5)
GLOBULIN SER CALC-MCNC: 3.7 G/DL (ref 2–4)
GLUCOSE SERPL-MCNC: 90 MG/DL (ref 65–100)
HCT VFR BLD AUTO: 35.7 % (ref 35–47)
HGB BLD-MCNC: 12 G/DL (ref 11.5–16)
IMM GRANULOCYTES # BLD: 0 K/UL (ref 0–0.04)
IMM GRANULOCYTES NFR BLD AUTO: 0 % (ref 0–0.5)
LIPASE SERPL-CCNC: 115 U/L (ref 73–393)
LYMPHOCYTES # BLD: 2 K/UL (ref 0.8–3.5)
LYMPHOCYTES NFR BLD: 34 % (ref 12–49)
MCH RBC QN AUTO: 29.5 PG (ref 26–34)
MCHC RBC AUTO-ENTMCNC: 33.6 G/DL (ref 30–36.5)
MCV RBC AUTO: 87.7 FL (ref 80–99)
MONOCYTES # BLD: 0.4 K/UL (ref 0–1)
MONOCYTES NFR BLD: 6 % (ref 5–13)
NEUTS SEG # BLD: 3.4 K/UL (ref 1.8–8)
NEUTS SEG NFR BLD: 59 % (ref 32–75)
NRBC # BLD: 0 K/UL (ref 0–0.01)
NRBC BLD-RTO: 0 PER 100 WBC
PLATELET # BLD AUTO: 256 K/UL (ref 150–400)
PMV BLD AUTO: 10.3 FL (ref 8.9–12.9)
POTASSIUM SERPL-SCNC: 3.5 MMOL/L (ref 3.5–5.1)
PROT SERPL-MCNC: 7.6 G/DL (ref 6.4–8.2)
RBC # BLD AUTO: 4.07 M/UL (ref 3.8–5.2)
SODIUM SERPL-SCNC: 142 MMOL/L (ref 136–145)
WBC # BLD AUTO: 5.8 K/UL (ref 3.6–11)

## 2018-05-01 PROCEDURE — 85025 COMPLETE CBC W/AUTO DIFF WBC: CPT | Performed by: EMERGENCY MEDICINE

## 2018-05-01 PROCEDURE — 99284 EMERGENCY DEPT VISIT MOD MDM: CPT

## 2018-05-01 PROCEDURE — 74011250636 HC RX REV CODE- 250/636: Performed by: EMERGENCY MEDICINE

## 2018-05-01 PROCEDURE — 74011000250 HC RX REV CODE- 250: Performed by: EMERGENCY MEDICINE

## 2018-05-01 PROCEDURE — 96375 TX/PRO/DX INJ NEW DRUG ADDON: CPT

## 2018-05-01 PROCEDURE — 96374 THER/PROPH/DIAG INJ IV PUSH: CPT

## 2018-05-01 PROCEDURE — 96376 TX/PRO/DX INJ SAME DRUG ADON: CPT

## 2018-05-01 PROCEDURE — 83690 ASSAY OF LIPASE: CPT | Performed by: EMERGENCY MEDICINE

## 2018-05-01 PROCEDURE — 99285 EMERGENCY DEPT VISIT HI MDM: CPT

## 2018-05-01 PROCEDURE — 80053 COMPREHEN METABOLIC PANEL: CPT | Performed by: EMERGENCY MEDICINE

## 2018-05-01 PROCEDURE — 36415 COLL VENOUS BLD VENIPUNCTURE: CPT | Performed by: EMERGENCY MEDICINE

## 2018-05-01 PROCEDURE — 96361 HYDRATE IV INFUSION ADD-ON: CPT

## 2018-05-01 PROCEDURE — 74011250637 HC RX REV CODE- 250/637: Performed by: EMERGENCY MEDICINE

## 2018-05-01 RX ORDER — SUCRALFATE 1 G/10ML
1 SUSPENSION ORAL 4 TIMES DAILY
Qty: 414 ML | Refills: 0 | Status: SHIPPED | OUTPATIENT
Start: 2018-05-01 | End: 2018-09-10

## 2018-05-01 RX ORDER — METOCLOPRAMIDE HYDROCHLORIDE 5 MG/ML
10 INJECTION INTRAMUSCULAR; INTRAVENOUS
Status: COMPLETED | OUTPATIENT
Start: 2018-05-01 | End: 2018-05-01

## 2018-05-01 RX ORDER — PROMETHAZINE HYDROCHLORIDE 25 MG/1
25 TABLET ORAL
Qty: 12 TAB | Refills: 0 | Status: SHIPPED | OUTPATIENT
Start: 2018-05-01 | End: 2018-12-30

## 2018-05-01 RX ORDER — SUCRALFATE 1 G/10ML
1 SUSPENSION ORAL
Status: COMPLETED | OUTPATIENT
Start: 2018-05-01 | End: 2018-05-01

## 2018-05-01 RX ORDER — LORAZEPAM 2 MG/ML
0.5 INJECTION INTRAMUSCULAR AS NEEDED
Status: COMPLETED | OUTPATIENT
Start: 2018-05-01 | End: 2018-05-01

## 2018-05-01 RX ORDER — LORAZEPAM 2 MG/ML
0.5 INJECTION INTRAMUSCULAR
Status: DISCONTINUED | OUTPATIENT
Start: 2018-05-01 | End: 2018-05-01

## 2018-05-01 RX ORDER — KETOROLAC TROMETHAMINE 30 MG/ML
15 INJECTION, SOLUTION INTRAMUSCULAR; INTRAVENOUS
Status: COMPLETED | OUTPATIENT
Start: 2018-05-01 | End: 2018-05-01

## 2018-05-01 RX ADMIN — METOCLOPRAMIDE 10 MG: 5 INJECTION, SOLUTION INTRAMUSCULAR; INTRAVENOUS at 05:40

## 2018-05-01 RX ADMIN — LORAZEPAM 0.5 MG: 2 INJECTION INTRAMUSCULAR; INTRAVENOUS at 06:31

## 2018-05-01 RX ADMIN — SUCRALFATE 1 G: 1 SUSPENSION ORAL at 08:20

## 2018-05-01 RX ADMIN — SODIUM CHLORIDE 2000 ML: 900 INJECTION, SOLUTION INTRAVENOUS at 05:40

## 2018-05-01 RX ADMIN — SODIUM CHLORIDE 20 MG: 9 INJECTION INTRAMUSCULAR; INTRAVENOUS; SUBCUTANEOUS at 06:00

## 2018-05-01 RX ADMIN — KETOROLAC TROMETHAMINE 15 MG: 30 INJECTION, SOLUTION INTRAMUSCULAR at 07:41

## 2018-05-01 NOTE — ED NOTES
0745: Bedside and Verbal shift change report given to Hanna Mitchell RN (oncoming nurse) by Parvez Short RN (offgoing nurse). Report included the following information SBAR, Kardex, ED Summary, [facility] and Recent Results     0801: Patient is alert and oriented x4. Patient has mid upper abdominal pain. Bowel sounds in all 4 quadrants. Pain is rated a 7/10.    0919: Patient verbalizes understanding of discharge instructions. Opportunity for questions provided. Patient in no apparent distress, VSS. Patient states \"pain still present but its decreasing. \" Patient ambulatory upon discharge.

## 2018-05-01 NOTE — Clinical Note
Work up was reassuring in the ED. I have prescribed Phenergan for nausea and Carafate for pain. Please call and arrange close follow up with Dr. Arely Nichols. Return to the ED if your pain/symptoms worsen.

## 2018-05-01 NOTE — ED TRIAGE NOTES
Triage note: Patient arrives via EMS from home c/o vomitng beginning at 2000 last night. Patient reports taking zofran and Tylenol with no relief.

## 2018-05-01 NOTE — DISCHARGE INSTRUCTIONS
Gastroparesis: Care Instructions  Your Care Instructions    When you have gastroparesis, your stomach takes a lot longer to empty. This delay can cause belly pain, bloating, and belching. It also can cause hiccups, heartburn, nausea or vomiting. You may not feel like eating. These symptoms may come and go. They most often occur during and after meals. You may feel full after only a few bites of food. This condition occurs when the nerves to the stomach don't work properly. Diabetes is the most common cause of this nerve damage. Gastroparesis can make it harder to control your blood sugar levels. But keeping your blood sugar levels under control may help with your symptoms. Parkinson's disease, stroke, and some medicines can also cause this condition. Home treatment can often help. Follow-up care is a key part of your treatment and safety. Be sure to make and go to all appointments, and call your doctor if you are having problems. It's also a good idea to know your test results and keep a list of the medicines you take. How can you care for yourself at home? · Eat several small meals each day rather than three large meals. · Eat foods that are low in fiber and fat. · If your doctor suggests it, take medicines that help the stomach empty more quickly. These are called motility agents. When should you call for help? Call your doctor now or seek immediate medical care if:  ? · You are vomiting. ? · You have new or worse belly pain. ? · You have a fever. ? · You cannot pass stools or gas. ? Watch closely for changes in your health, and be sure to contact your doctor if you have any problems. Where can you learn more? Go to http://maricruz-katy.info/. Enter M106 in the search box to learn more about \"Gastroparesis: Care Instructions. \"  Current as of: May 12, 2017  Content Version: 11.4  © 7789-1161 Healthwise, [employer].  Care instructions adapted under license by [employer] (which disclaims liability or warranty for this information). If you have questions about a medical condition or this instruction, always ask your healthcare professional. [address] any warranty or liability for your use of this information.

## 2018-05-01 NOTE — ED PROVIDER NOTES
Patient is a 26 y.o. female presenting with abdominal pain. Abdominal Pain    Associated symptoms include nausea and vomiting. Pertinent negatives include no diarrhea, no constipation, no dysuria, no headaches and no chest pain. 26year old female with anemia, gastroparesis, presents with abdominal pain nausea and vomiting since last night. Symptoms are consistent with there gastroparesis. She has tried zofran without relief. No fevers. No past surgical history. Denies alcohol or drug use. Does reports increased stress/anxiety contributes to symptoms. Followed by Dr. Zaria Tang. Denies pregnancy. Denies fever, cough or cold symptoms. Denies urinary symptoms. Pain is 8/10. Past Medical History:   Diagnosis Date    Anemia     Gastroparesis     Gastroparesis 12/12/2017    Ovarian cyst 2016       History reviewed. No pertinent surgical history. History reviewed. No pertinent family history. Social History     Social History    Marital status:      Spouse name: N/A    Number of children: N/A    Years of education: N/A     Occupational History    Not on file. Social History Main Topics    Smoking status: Never Smoker    Smokeless tobacco: Never Used    Alcohol use No    Drug use: No    Sexual activity: No      Comment: single no children     Other Topics Concern    Not on file     Social History Narrative         ALLERGIES: Latex and Avocado    Review of Systems   Constitutional: Negative for fatigue. HENT: Negative for congestion. Eyes: Negative for visual disturbance. Respiratory: Negative for cough. Cardiovascular: Negative for chest pain. Gastrointestinal: Positive for abdominal pain, nausea and vomiting. Negative for constipation and diarrhea. Endocrine: Negative for polyuria. Genitourinary: Negative for dysuria. Musculoskeletal: Negative for gait problem. Skin: Negative for rash. Neurological: Negative for headaches.    Psychiatric/Behavioral: Negative for dysphoric mood. The patient is nervous/anxious. Vitals:    05/01/18 0519   BP: 130/81   Pulse: 95   Resp: 16   Temp: 97.8 °F (36.6 °C)   SpO2: 100%            Physical Exam   Constitutional: She is oriented to person, place, and time. She appears well-developed and well-nourished. No distress. Actively vomiting   HENT:   Head: Normocephalic and atraumatic. Mouth/Throat: Oropharynx is clear and moist. No oropharyngeal exudate. Eyes: Conjunctivae and EOM are normal. Pupils are equal, round, and reactive to light. Right eye exhibits no discharge. Left eye exhibits no discharge. No scleral icterus. Neck: Normal range of motion. Neck supple. No JVD present. Cardiovascular: Normal rate, regular rhythm, normal heart sounds and intact distal pulses. Exam reveals no gallop and no friction rub. No murmur heard. Pulmonary/Chest: Effort normal and breath sounds normal. No stridor. No respiratory distress. She has no wheezes. She has no rales. She exhibits no tenderness. Abdominal: Soft. Bowel sounds are normal. She exhibits no distension and no mass. There is no tenderness. There is no rebound and no guarding. Musculoskeletal: Normal range of motion. She exhibits no edema or tenderness. Neurological: She is alert and oriented to person, place, and time. She has normal reflexes. No cranial nerve deficit. She exhibits normal muscle tone. Coordination normal.   Skin: Skin is warm and dry. No rash noted. No erythema. Psychiatric: She has a normal mood and affect. Her behavior is normal. Judgment and thought content normal.        [facility]      ED Course       Procedures    PHenergan, pepcid, IV fluids. Labs. Will avoid narcotics. Will try Ativan if above doesn't work. Labs still pending. Patient still complaining of 7/10 pain.  states she always has some pain. patient states her baseline is 5-6/10 pain- so we are making progress. Will try Toradol.  Patient aware we are avoiding narcotics. Seems to be feeling better care signed over to Dr. Quang Payne at change of shift. Anticipate discharge once fluids infused.

## 2018-05-01 NOTE — PROGRESS NOTES
8:28 AM  Change of shift. Care of patient taken over from Dr Bhavna Avila; H&P reviewed, bedside handoff complete. Awaiting carafate/ will monitor; plan to d/c as already discussed w/pt; she may need to see her PCP for 'anxiety' treatments;     8:44 AM 'doing ok'; tolerating PO; will d/c home with close PCP (anxiety) and GI (gastroparesis)  follow-up; Diana Pal's  results have been reviewed with her. She has been counseled regarding her diagnosis. She verbally conveys understanding and agreement of the signs, symptoms, diagnosis, treatment and prognosis and additionally agrees to Call/ Arrange follow up as recommended with Dr. Carline Dc MD in 24 - 48 hours. She also agrees with the care-plan and conveys that all of her questions have been answered. I have also put together some discharge instructions for her that include: 1) educational information regarding their diagnosis, 2) how to care for their diagnosis at home, as well a 3) list of reasons why they would want to return to the ED prior to their follow-up appointment, should their condition change or for concerns.

## 2018-05-01 NOTE — ED NOTES
Assumed care of patient at this time. Patient comes in stating that she has a history of gastroparesis that is usually well managed. States that she has had increase in stress and anxiety over the past few days. She feels that as a result she is having a recurrence/relapse of her gastroparesis. Reports severe abdominal pain, nausea and vomiting since 2030. States she initially was vomiting food, but now is just dry heaving bile. Has taken PO Zofran at home with no improvement. CM x 2. Call bell within reach of patient at this time.

## 2018-05-08 ENCOUNTER — TELEPHONE (OUTPATIENT)
Dept: INTERNAL MEDICINE CLINIC | Age: 23
End: 2018-05-08

## 2018-05-08 NOTE — TELEPHONE ENCOUNTER
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

## 2018-05-08 NOTE — TELEPHONE ENCOUNTER
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 Please advise if she can come in for a CPE.   Advise - [phone]

## 2018-05-14 ENCOUNTER — TELEPHONE (OUTPATIENT)
Dept: INTERNAL MEDICINE CLINIC | Age: 23
End: 2018-05-14

## 2018-05-21 ENCOUNTER — TELEPHONE (OUTPATIENT)
Dept: INTERNAL MEDICINE CLINIC | Age: 23
End: 2018-05-21

## 2018-06-19 ENCOUNTER — CLINICAL SUPPORT (OUTPATIENT)
Dept: INTERNAL MEDICINE CLINIC | Age: 23
End: 2018-06-19

## 2018-06-19 DIAGNOSIS — Z02.0 SCHOOL HEALTH EXAMINATION: Primary | ICD-10-CM

## 2018-06-19 NOTE — LETTER
2018 9:38 AM 
 
Ms. Diana Pal 
[address] [id number] Name Sex    
  Shnanon Mcpherson Female 1995  
   
Patient Demographics   
  Address Phone E-mail Address  
  [address] [facility] [address] [phone] (Home) [phone] (Work) [phone] (Mobile) [email or web address]  
   
   
Immunizations  
   
Current Immunizations  Reviewed on 2018  
  Name Date Dose VIS Date Route  
  HPV 2017 -- -- --  
  Influenza Nasal Vaccine 10/31/2017 -- -- --  
  Influenza Vaccine (Quad) PF 2016 0.5 mL 2015 Intramuscular  
  NDC: 42692-034-32  
  Tdap 2018 0.5 mL 2015 Intramuscular  
  NDC: 25955-092-99  
  Tdap 2008 -- -- --

## 2018-06-27 ENCOUNTER — TELEPHONE (OUTPATIENT)
Dept: INTERNAL MEDICINE CLINIC | Age: 23
End: 2018-06-27

## 2018-06-27 NOTE — TELEPHONE ENCOUNTER
Pt calling stating she when she saw her GI doctor that he stated to her  that when she is under stress it causes her gastroparesis to be worse and now that she is starting school she feels she will be under stress then too. So pt would like to know if Dr Blake Monsivais could recommend a psychologist for her.

## 2018-07-17 ENCOUNTER — TELEPHONE (OUTPATIENT)
Dept: FAMILY MEDICINE CLINIC | Age: 23
End: 2018-07-17

## 2018-07-17 ENCOUNTER — HOSPITAL ENCOUNTER (OUTPATIENT)
Dept: GENERAL RADIOLOGY | Age: 23
Discharge: HOME | End: 2018-07-17
Payer: OTHER MISCELLANEOUS

## 2018-07-17 ENCOUNTER — OFFICE VISIT (OUTPATIENT)
Dept: FAMILY MEDICINE CLINIC | Age: 23
End: 2018-07-17

## 2018-07-17 VITALS
HEIGHT: 63 IN | DIASTOLIC BLOOD PRESSURE: 73 MMHG | WEIGHT: 96.4 LBS | SYSTOLIC BLOOD PRESSURE: 111 MMHG | TEMPERATURE: 96.9 F | HEART RATE: 77 BPM | BODY MASS INDEX: 17.08 KG/M2 | OXYGEN SATURATION: 100 % | RESPIRATION RATE: 16 BRPM

## 2018-07-17 DIAGNOSIS — S60.211A CONTUSION OF RIGHT WRIST, INITIAL ENCOUNTER: ICD-10-CM

## 2018-07-17 DIAGNOSIS — M25.531 RIGHT WRIST PAIN: ICD-10-CM

## 2018-07-17 DIAGNOSIS — S60.211A CONTUSION OF RIGHT WRIST, INITIAL ENCOUNTER: Primary | ICD-10-CM

## 2018-07-17 PROCEDURE — 73110 X-RAY EXAM OF WRIST: CPT

## 2018-07-17 NOTE — PROGRESS NOTES
Chief Complaint   Patient presents with    Wrist Swelling     Right wrist pain and swelling from working with a patient who pulled on her arm. Noted pain and swelling after 20 minutes.  Iced on arrival

## 2018-07-17 NOTE — TELEPHONE ENCOUNTER
Called patient and after verifying patient identifiers, patient given results of xray and verbalized understanding.

## 2018-07-17 NOTE — PROGRESS NOTES
Subjective:      Demetrius Andrade is a 26 y.o. female seen for evaluation and treatment of a right wrist injury. This is evaluated as a workers compensation injury. The injury was sustained 2 hours ago, and occurred while transferring a patient. Ms. Sandhya Raymundo was assisting a physical therapist with the transfer of a pt from bed to chair. The pt was apparently weak on the right side and grabbed Ms. Pal's wrist, and pulled down. She did not hear or sense a pop or snap at the time of the injury. The patient notes pain and mild swelling since the injury. She has injured this site in the past.  She has ice pack to the wrist now. She has not taken any meds for the pain. Pain is described as mild. Denies any pain to right hand. Objective:     Visit Vitals    /73 (BP 1 Location: Left arm, BP Patient Position: Sitting)    Pulse 77    Temp 96.9 °F (36.1 °C) (Oral)    Resp 16    Ht 5' 3\" (1.6 m)    Wt 96 lb 6.4 oz (43.7 kg)    LMP 07/11/2018 (Exact Date)    SpO2 100%    BMI 17.08 kg/m2      Appearance: alert, well appearing, and in no distress. Musculoskeletal exam: R wrist:  no joint tenderness or deformity, no muscular tenderness noted, full range of motion without pain. Mild erythema to right medial wrist and tenderness to area that pt identifies as injured site. R hand: normal ROM, non-tender, no swelling. Imaging  pending official reading by Radiologist    Assessment/Plan:       ICD-10-CM ICD-9-CM    1. Contusion of right wrist, initial encounter S60.211A 923.21 CANCELED: XR WRIST LT AP/LAT/OBL MIN 3V   2. Right wrist pain M25.531 719.43 CANCELED: XR WRIST LT AP/LAT/OBL MIN 3V     Recommend conservative treatment. May return to work full duty without restrictions. The patient is advised to rest and apply cold or ice intermittently. May take otc analgesic prn. RTC prn. Zay Dey NP  This note will not be viewable in [address]

## 2018-07-17 NOTE — MR AVS SNAPSHOT
[address] [facility] [address] 
 
 
 [address], [address] [place] [address] 
[phone] Patient: Meriel Osgood 
MRN: [id number] :1995 Visit Information Date & Time Provider Department Dept. Phone Encounter #  
 2018 11:15 AM Lorie Quezada NP [address] [facility] [phone] [id number] Follow-up Instructions Return if symptoms worsen or fail to improve. Upcoming Health Maintenance Date Due  
 PAP AKA CERVICAL CYTOLOGY 2017 HPV Age 9Y-34Y (2 of 3 - Female 3 Dose Series) 2018 Influenza Age 5 to Adult 2018 DTaP/Tdap/Td series (3 - Td) 2028 Allergies as of 2018  Review Complete On: 2018 By: Lorie Quezada NP Severity Noted Reaction Type Reactions Latex  2017    Itching Avocado  2015    Rash Current Immunizations  Reviewed on 2018 Name Date HPV 2017 Influenza Nasal Vaccine 10/31/2017 Influenza Vaccine (Quad) PF 2016 Tdap 2018, 2008 Not reviewed this visit You Were Diagnosed With   
  
 Codes Comments Contusion of right wrist, initial encounter    -  Primary ICD-10-CM: Y44.422Y ICD-9-CM: 923.21 Right wrist pain     ICD-10-CM: M25.531 ICD-9-CM: 719.43 Vitals BP Pulse Temp Resp Height(growth percentile) Weight(growth percentile) 111/73 (BP 1 Location: Left arm, BP Patient Position: Sitting) 77 96.9 °F (36.1 °C) (Oral) 16 5' 3\" (1.6 m) 96 lb 6.4 oz (43.7 kg) LMP SpO2 BMI OB Status Smoking Status 2018 (Exact Date) 100% 17.08 kg/m2 Having regular periods Never Smoker Vitals History BMI and BSA Data Body Mass Index Body Surface Area 17.08 kg/m 2 1.39 m 2 Preferred Pharmacy Pharmacy Name Phone [facility] [address], [address] [place] [address] Rd. [phone] Your Updated Medication List  
  
   
 This list is accurate as of 7/17/18 11:17 AM.  Always use your most recent med list.  
  
  
  
  
 human papillomavirus vaccine QV (PF) 20-40-40-20 mcg/0.5 mL injection Commonly known as:  GARDASIL (PF)  
0.5 mL IM at 0, 1, and 6 months  
  
 hydrocortisone 2.5 % rectal cream  
Commonly known as:  ANUSOL-HC Apply to rectum 4 x a day As needed for hemorrhoids. LEVORA-28 0.15-0.03 mg Tab Generic drug:  levonorgestrel-ethinyl estradiol Take  by mouth.  
  
 promethazine 25 mg tablet Commonly known as:  PHENERGAN Take 1 Tab by mouth every six (6) hours as needed. PROTONIX 20 mg tablet Generic drug:  pantoprazole Take 20 mg by mouth daily. REGLAN 10 mg tablet Generic drug:  metoclopramide HCl Take 10 mg by mouth Before breakfast, lunch, and dinner. sucralfate 100 mg/mL suspension Commonly known as:  Adah Dory Take 5 mL by mouth four (4) times daily. Follow-up Instructions Return if symptoms worsen or fail to improve. To-Do List   
 07/17/2018 Imaging:  XR WRIST LT AP/LAT/OBL MIN 3V Patient Instructions Contusion: Care Instructions Your Care Instructions Contusion is the medical term for a bruise. It is the result of a direct blow or an impact, such as a fall. Contusions are common sports injuries. Most people think of a bruise as a black-and-blue spot. This happens when small blood vessels get torn and leak blood under the skin. But bones, muscles, and organs can also get bruised. This may damage deep tissues but not cause a bruise you can see. The doctor will do a physical exam to find the location of your contusion. You may also have tests to make sure you do not have a more serious injury, such as a broken bone or nerve damage. These may include X-rays or other imaging tests like a CT scan or MRI. Deep-tissue contusions may cause pain and swelling.  But if there is no serious damage, they will often get better in a few weeks with home treatment. The doctor has checked you carefully, but problems can develop later. If you notice any problems or new symptoms, get medical treatment right away. Follow-up care is a key part of your treatment and safety. Be sure to make and go to all appointments, and call your doctor if you are having problems. It's also a good idea to know your test results and keep a list of the medicines you take. How can you care for yourself at home? · Put ice or a cold pack on the sore area for 10 to 20 minutes at a time to stop swelling. Put a thin cloth between the ice pack and your skin. · Be safe with medicines. Read and follow all instructions on the label. ¨ If the doctor gave you a prescription medicine for pain, take it as prescribed. ¨ If you are not taking a prescription pain medicine, ask your doctor if you can take an over-the-counter medicine. · If you can, prop up the sore area on pillows as much as possible for the next few days. Try to keep the sore area above the level of your heart. When should you call for help? Call your doctor now or seek immediate medical care if: 
  · Your pain gets worse.  
  · You have new or worse swelling.  
  · You have tingling, weakness, or numbness in the area near the contusion.  
  · The area near the contusion is cold or pale.  
 Watch closely for changes in your health, and be sure to contact your doctor if: 
  · You do not get better as expected. Where can you learn more? Go to http://maricruz-katy.info/. Enter E532 in the search box to learn more about \"Contusion: Care Instructions. \" Current as of: November 20, 2017 Content Version: 11.7 © 6507-1849 [employer]. Care instructions adapted under license by [employer] (which disclaims liability or warranty for this information).  If you have questions about a medical condition or this instruction, always ask your healthcare professional. [address] any warranty or liability for your use of this information. Introducing [facility]! Dear Duane Driver: Thank you for requesting a [employer] account. Our records indicate that you already have an active [employer] account. You can access your account anytime at [email or web address] Did you know that you can access your hospital and ER discharge instructions at any time in [employer]? You can also review all of your test results from your hospital stay or ER visit. Additional Information If you have questions, please visit the Frequently Asked Questions section of the [employer] website at [email or web address] Remember, [employer] is NOT to be used for urgent needs. For medical emergencies, dial 911. Now available from your iPhone and Android! Please provide this summary of care documentation to your next provider. Your primary care clinician is listed as Karon Pack. If you have any questions after today's visit, please call [phone].

## 2018-07-17 NOTE — TELEPHONE ENCOUNTER
----- Message from Mitch Guerra NP sent at 7/17/2018 12:19 PM EDT -----  Please inform pt xray is normal.  Thanks.

## 2018-07-17 NOTE — PATIENT INSTRUCTIONS

## 2018-08-07 ENCOUNTER — TELEPHONE (OUTPATIENT)
Dept: INTERNAL MEDICINE CLINIC | Age: 23
End: 2018-08-07

## 2018-08-07 NOTE — TELEPHONE ENCOUNTER
Spoke with patient again referred patient to  and to keep upcoming appointment with Dr. Chitra Ramirez.

## 2018-08-07 NOTE — TELEPHONE ENCOUNTER
Srinivasan Thomson (Self) [phone]      Pt calling regarding her 's new pt appt with dr interiano on the 29th. She says that her  has a problem and she wants him to be seen sooner. Explained that dr interiano is not here this week, but she insisted a mess be sent to the nurse.

## 2018-09-10 ENCOUNTER — OFFICE VISIT (OUTPATIENT)
Dept: BEHAVIORAL/MENTAL HEALTH CLINIC | Age: 23
End: 2018-09-10

## 2018-09-10 VITALS
HEART RATE: 76 BPM | BODY MASS INDEX: 16.66 KG/M2 | HEIGHT: 63 IN | SYSTOLIC BLOOD PRESSURE: 103 MMHG | DIASTOLIC BLOOD PRESSURE: 63 MMHG | WEIGHT: 94 LBS

## 2018-09-10 DIAGNOSIS — F41.1 GAD (GENERALIZED ANXIETY DISORDER): Primary | ICD-10-CM
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
## 2018-09-13 ENCOUNTER — TELEPHONE (OUTPATIENT)
Dept: INTERNAL MEDICINE CLINIC | Age: 23
End: 2018-09-13

## 2018-09-13 NOTE — TELEPHONE ENCOUNTER
Chad Estrada (Self) [phone] (H)     Pt was prescribed buspar by Luciano Laya and says she would like to discuss this before she begins taking it

## 2018-09-13 NOTE — TELEPHONE ENCOUNTER
Verified patient identity with two identifiers. Spoke with patient by phone. Prescribed Buspar 7.5mg bid by psych NP. Patient wants to make sure Dr. Jaydon Stephenson does not have any reservations about her taking this medication before she will start taking it. Will forward to Dr. Jaydon Stephenson.

## 2018-09-20 ENCOUNTER — TELEPHONE (OUTPATIENT)
Dept: BEHAVIORAL/MENTAL HEALTH CLINIC | Age: 23
End: 2018-09-20

## 2018-09-20 NOTE — TELEPHONE ENCOUNTER
Called and said that you instructed her to call back about the medication and her plans to go forward.  She says that she wants you call her to discuss her options

## 2018-12-30 ENCOUNTER — OFFICE VISIT (OUTPATIENT)
Dept: URGENT CARE | Age: 23
End: 2018-12-30

## 2018-12-30 VITALS
SYSTOLIC BLOOD PRESSURE: 113 MMHG | TEMPERATURE: 97 F | HEIGHT: 63 IN | RESPIRATION RATE: 16 BRPM | DIASTOLIC BLOOD PRESSURE: 73 MMHG | HEART RATE: 77 BPM | WEIGHT: 97 LBS | OXYGEN SATURATION: 99 % | BODY MASS INDEX: 17.19 KG/M2

## 2018-12-30 DIAGNOSIS — H00.012 HORDEOLUM EXTERNUM OF RIGHT LOWER EYELID: Primary | ICD-10-CM

## 2018-12-30 DIAGNOSIS — Z76.0 MEDICATION REFILL: ICD-10-CM

## 2018-12-30 DIAGNOSIS — K31.84 GASTROPARESIS: ICD-10-CM

## 2018-12-30 RX ORDER — POLYMYXIN B SULFATE AND TRIMETHOPRIM 1; 10000 MG/ML; [USP'U]/ML
1 SOLUTION OPHTHALMIC EVERY 6 HOURS
Qty: 1 BOTTLE | Refills: 0 | Status: SHIPPED | OUTPATIENT
Start: 2018-12-30 | End: 2019-01-06

## 2018-12-30 RX ORDER — PANTOPRAZOLE SODIUM 20 MG/1
20 TABLET, DELAYED RELEASE ORAL DAILY
Qty: 30 TAB | Refills: 0 | Status: SHIPPED | OUTPATIENT
Start: 2018-12-30 | End: 2019-03-07 | Stop reason: SDUPTHER

## 2018-12-30 NOTE — PATIENT INSTRUCTIONS
Follow up with [facility] for any new, worsening or changes OR without improvement over next 7 days. Start antibiotic  Warm moist compresses  Pantoprazole refill ed for 30 days. Follow up with GI or PCP for further refills within next month. Styes and Chalazia: Care Instructions  Your Care Instructions    Styes and chalazia (say \"wfc-NWG-nqg-rupali\") are both conditions that can cause swelling of the eyelid. A stye is an infection in the root of an eyelash. The infection causes a tender red lump on the edge of the eyelid. The infection can spread until the whole eyelid becomes red and inflamed. Styes usually break open, and a tiny amount of pus drains. They usually clear up on their own in about a week, but they sometimes need treatment with antibiotics. A chalazion is a lump or cyst in the eyelid (chalazion is singular; chalazia is plural). It is caused by swelling and inflammation of deep oil glands inside the eyelid. Chalazia are usually not infected. They can take a few months to heal.  If a chalazion becomes more swollen and painful or does not go away, you may need to have it drained by your doctor. Follow-up care is a key part of your treatment and safety. Be sure to make and go to all appointments, and call your doctor if you are having problems. It's also a good idea to know your test results and keep a list of the medicines you take. How can you care for yourself at home? · Do not rub your eyes. Do not squeeze or try to open a stye or chalazion. · To help a stye or chalazion heal faster:  ? Put a warm, moist compress on your eye for 5 to 10 minutes, 3 to 6 times a day. Heat often brings a stye to a point where it drains on its own. Keep in mind that warm compresses will often increase swelling a little at first.  ? Do not use hot water or heat a wet cloth in a microwave oven. The compress may get too hot and can burn the eyelid.   · Always wash your hands before and after you use a compress or touch your eyes. · If the doctor gave you antibiotic drops or ointment, use the medicine exactly as directed. Use the medicine for as long as instructed, even if your eye starts to feel better. · To put in eyedrops or ointment:  ? Tilt your head back, and pull your lower eyelid down with one finger. ? Drop or squirt the medicine inside the lower lid. ? Close your eye for 30 to 60 seconds to let the drops or ointment move around. ? Do not touch the ointment or dropper tip to your eyelashes or any other surface. · Do not wear eye makeup or contact lenses until the stye or chalazion heals. · Do not share towels, pillows, or washcloths while you have a stye. When should you call for help? Call your doctor now or seek immediate medical care if:    · You have pain in your eye.     · You have a change in vision or loss of vision.     · Redness and swelling get much worse.    Watch closely for changes in your health, and be sure to contact your doctor if:    · Your stye does not get better in 1 week.     · Your chalazion does not start to get better after several weeks. Where can you learn more? Go to http://maricruz-katy.info/. Enter O578 in the search box to learn more about \"Styes and Chalazia: Care Instructions. \"  Current as of: December 3, 2017  Content Version: 11.8  © 8467-8306 [employer]. Care instructions adapted under license by [employer] (which disclaims liability or warranty for this information). If you have questions about a medical condition or this instruction, always ask your healthcare professional. [address] any warranty or liability for your use of this information.

## 2018-12-30 NOTE — PROGRESS NOTES
Here for red bump that started on right lower eyelid 4 days ago  It is sore to touch. No discharge. Symptoms constant and not relieved so far with compresses. Denies headache, fever, chills, vision change or eye injury. Feels well otherwise. Secondarily she is requesting refill of pantoprazole. She promtoes is taking this for gastroparesis. Taking 20mg once daily and states she is tolerating well without SE/AE. Typical symptoms are well controlled. Usually refilled by PCP but is now out. Says plans on following up soon for further refills. Past Medical History:   Diagnosis Date    Anemia     Gastroparesis     Gastroparesis 12/12/2017    Ovarian cyst 2016        History reviewed. No pertinent surgical history. History reviewed. No pertinent family history. Social History     Socioeconomic History    Marital status:      Spouse name: Not on file    Number of children: Not on file    Years of education: Not on file    Highest education level: Not on file   Social Needs    Financial resource strain: Not on file    Food insecurity - worry: Not on file    Food insecurity - inability: Not on file    Transportation needs - medical: Not on file   [employer] needs - non-medical: Not on file   Occupational History    Not on file   Tobacco Use    Smoking status: Never Smoker    Smokeless tobacco: Never Used   Substance and Sexual Activity    Alcohol use: No     Alcohol/week: 0.0 oz    Drug use: No    Sexual activity: No     Comment: single no children   Other Topics Concern    Not on file   Social History Narrative    Not on file                ALLERGIES: Latex and Avocado    Review of Systems   Gastrointestinal: Negative for nausea and vomiting. Neurological: Negative for dizziness and weakness. All other systems reviewed and are negative.       Vitals:    12/30/18 1047   BP: 113/73   Pulse: 77   Resp: 16   Temp: 97 °F (36.1 °C)   SpO2: 99%   Weight: 97 lb (44 kg)   Height: 5' 3\" (1.6 m)       Physical Exam   Constitutional: She is oriented to person, place, and time. No distress. HENT:   Head: Normocephalic and atraumatic. Mouth/Throat: Oropharynx is clear and moist. No oropharyngeal exudate. TMs normal.   Eyes: Conjunctivae and EOM are normal. Pupils are equal, round, and reactive to light. Neck: Normal range of motion. Neck supple. Cardiovascular: Normal rate, regular rhythm and normal heart sounds. Exam reveals no gallop and no friction rub. No murmur heard. Pulmonary/Chest: Effort normal and breath sounds normal. No respiratory distress. She has no wheezes. She has no rales. Lymphadenopathy:     She has no cervical adenopathy. Neurological: She is alert and oriented to person, place, and time. Skin: She is not diaphoretic. Psychiatric: She has a normal mood and affect. Her behavior is normal. Thought content normal.       MDM     Differential Diagnosis; Clinical Impression; Plan:       CLINICAL IMPRESSION:  (H00.012) Hordeolum externum of right lower eyelid  (primary encounter diagnosis)  (Z76.0) Medication refill  (K31.84) Gastroparesis    Orders Placed This Encounter      pantoprazole (PROTONIX) 20 mg tablet      trimethoprim-polymyxin b (POLYTRIM) ophthalmic solution      Plan:  Follow up with [facility] for any new, worsening or changes OR without improvement over next 7 days. Start antibiotic drops  Warm moist compresses  Pantoprazole refill ed for 30 days. Follow up with GI or PCP for further refills within next month. We have reviewed concerning signs/symptoms, normal vs abnormal progression of medical condition and when to seek immediate medical attention. Schedule with PCP or Urgent Care immediately for worsening or new symptoms.               Procedures

## 2019-03-07 ENCOUNTER — OFFICE VISIT (OUTPATIENT)
Dept: INTERNAL MEDICINE CLINIC | Age: 24
End: 2019-03-07

## 2019-03-07 VITALS
WEIGHT: 94.6 LBS | HEIGHT: 63 IN | DIASTOLIC BLOOD PRESSURE: 60 MMHG | SYSTOLIC BLOOD PRESSURE: 90 MMHG | BODY MASS INDEX: 16.76 KG/M2 | RESPIRATION RATE: 16 BRPM | HEART RATE: 85 BPM | OXYGEN SATURATION: 97 % | TEMPERATURE: 97.9 F

## 2019-03-07 DIAGNOSIS — Z00.00 ROUTINE GENERAL MEDICAL EXAMINATION AT A HEALTH CARE FACILITY: Primary | ICD-10-CM

## 2019-03-07 DIAGNOSIS — F41.9 ANXIETY: ICD-10-CM

## 2019-03-07 DIAGNOSIS — Z76.0 MEDICATION REFILL: ICD-10-CM

## 2019-03-07 DIAGNOSIS — K31.84 GASTROPARESIS: ICD-10-CM

## 2019-03-07 DIAGNOSIS — R63.6 UNDERWEIGHT DUE TO INADEQUATE CALORIC INTAKE: ICD-10-CM

## 2019-03-07 RX ORDER — PANTOPRAZOLE SODIUM 20 MG/1
20 TABLET, DELAYED RELEASE ORAL DAILY
Qty: 90 TAB | Refills: 3 | Status: SHIPPED | OUTPATIENT
Start: 2019-03-07 | End: 2019-10-04 | Stop reason: SDUPTHER

## 2019-03-07 NOTE — PROGRESS NOTES
Subjective:   Janak Burrell is here today for a full physical.  She is followed by Dr Richard Carrera. LMP 2/12/19. Regular. Sexually active and on OCP. Health Maintenance  Immunizations:    Influenza: up to date. Tetanus: up to date. Gardasil: n/a. Cancer screening:    Cervical: reviewed guidelines, UTD, done by OBGYN, records requested. Patient Care Team:  aRni Vaz MD as PCP - General (Internal Medicine)     The following sections were reviewed & updated as appropriate: PMH, PSH, FH, and SH. Patient Active Problem List   Diagnosis Code    Gastroparesis on and off symptoms and taking protonix and reglan as needed. She take tums. Acidic foods can cause nausea and vomiting. She is followed by Dr Hector Holter. K31.84    Underweight due to inadequate caloric intake R63.6    JOAQUIN (generalized anxiety disorder)  2-3 x a week feels anxiety. She has been recommend to take medication by psychiatry. F41.1      Prior to Admission medications    Medication Sig Start Date End Date Taking? Authorizing Provider   pantoprazole (PROTONIX) 20 mg tablet Take 1 Tab by mouth daily. 12/30/18  Yes Melchor Coley NP   metoclopramide HCl (REGLAN) 10 mg tablet Take 10 mg by mouth Before breakfast, lunch, and dinner. Yes Provider, Historical   levonorgestrel-ethinyl estradiol (LEVORA-28) 0.15-0.03 mg tab Take  by mouth. Yes Provider, Historical          Allergies   Allergen Reactions    Latex Itching    Avocado Rash        Review of Systems   Constitutional: Negative for chills, fever and malaise/fatigue. HENT: Negative for congestion and sore throat. Eyes: Negative for blurred vision and double vision. Respiratory: Negative for cough, shortness of breath and wheezing. Cardiovascular: Negative for chest pain, palpitations and leg swelling. Gastrointestinal: Positive for constipation (on and off), heartburn (on and off) and nausea (on and off). Negative for abdominal pain, blood in stool, diarrhea and vomiting. Genitourinary: Negative for dysuria, frequency and urgency. Musculoskeletal: Negative for back pain, joint pain and myalgias. Skin: Negative for rash. Neurological: Negative for dizziness, sensory change, focal weakness and headaches. Psychiatric/Behavioral: Negative for depression and suicidal ideas. The patient does not have insomnia. Objective:   Physical Exam   Constitutional: She appears well-developed. No distress. HENT:   Head: Normocephalic and atraumatic. Right Ear: Tympanic membrane and ear canal normal.   Left Ear: Tympanic membrane and ear canal normal.   Nose: Nose normal.   Mouth/Throat: Oropharynx is clear and moist.   Eyes: Conjunctivae and EOM are normal. Pupils are equal, round, and reactive to light. Neck: Normal range of motion. Carotid bruit is not present. No thyromegaly present. Cardiovascular: Normal rate, regular rhythm, normal heart sounds and intact distal pulses. No murmur heard. Pulmonary/Chest: Effort normal and breath sounds normal. She has no wheezes. Abdominal: Soft. Bowel sounds are normal. She exhibits no distension and no mass. There is no tenderness. Musculoskeletal: She exhibits no edema. Lymphadenopathy:     She has no cervical adenopathy. Neurological: She has normal strength. No cranial nerve deficit or sensory deficit. Skin: No rash noted. Psychiatric: She has a normal mood and affect. Nursing note and vitals reviewed. Visit Vitals  BP 90/60 (BP 1 Location: Left arm, BP Patient Position: Sitting)   Pulse 85   Temp 97.9 °F (36.6 °C) (Oral)   Resp 16   Ht 5' 3\" (1.6 m)   Wt 94 lb 9.6 oz (42.9 kg)   LMP 02/12/2019 (Exact Date)   SpO2 97%   BMI 16.76 kg/m²         Diana Pal is a 22 y.o. female who was seen in clinic today (3/7/2019) for a full physical.       Assessment & Plan:   Encounter Diagnoses   Name Primary?     Routine general medical examination at a health care facility  Health maintenance reviewed and updated with patient today at visit. Yes    Gastroparesis  Having some nighttime symptoms of reflux and recommend restarting protonix daily. She will continue with Reglan as needed as per her GI specialist.     Anxiety counseled regarding treatment options and recommend medication sertraline or BuSpar which she had discussed using with her psychiatrist in the past also recommend counselor and she is given a handout regarding anxiety. Underweight:Counseled regarding elevated BMI and current weight goals. Reviewed weight gain strategies including dietary changes and exercise. Orders Placed This Encounter    CBC WITH AUTOMATED DIFF    METABOLIC PANEL, COMPREHENSIVE    pantoprazole (PROTONIX) 20 mg tablet        Advised her to call back or return to office if symptoms worsen/change/persist.  Discussed expected course/resolution/complications of diagnosis in detail with patient. Medication risks/benefits/costs/interactions/alternatives discussed with patient. She was given an after visit summary which includes diagnoses, current medications, & vitals. She expressed understanding with the diagnosis and plan. Aspects of this note may have been generated using voice recognition software. Despite editing, there may be some syntax errors.        Edwige Pollock MD

## 2019-03-07 NOTE — PATIENT INSTRUCTIONS
Learning About Anxiety Disorders  What are anxiety disorders? Anxiety disorders are a type of medical problem. They cause severe anxiety. When you feel anxious, you feel that something bad is about to happen. This feeling interferes with your life. These disorders include:  · Generalized anxiety disorder. You feel worried and stressed about many everyday events and activities. This goes on for several months and disrupts your life on most days. · Panic disorder. You have repeated panic attacks. A panic attack is a sudden, intense fear or anxiety. It may make you feel short of breath. Your heart may pound. · Social anxiety disorder. You feel very anxious about what you will say or do in front of people. For example, you may be scared to talk or eat in public. This problem affects your daily life. · Phobias. You are very scared of a specific object, situation, or activity. For example, you may fear spiders, high places, or small spaces. What are the symptoms? Generalized anxiety disorder  Symptoms may include:  · Feeling worried and stressed about many things almost every day. · Feeling tired or irritable. You may have a hard time concentrating. · Having headaches or muscle aches. · Having a hard time getting to sleep or staying asleep. Panic disorder  You may have repeated panic attacks when there is no reason for feeling afraid. You may change your daily activities because you worry that you will have another attack. Symptoms may include:  · Intense fear, terror, or anxiety. · Trouble breathing or very fast breathing. · Chest pain or tightness. · A heartbeat that races or is not regular. Social anxiety disorder  Symptoms may include:  · Fear about a social situation, such as eating in front of others or speaking in public. You may worry a lot. Or you may be afraid that something bad will happen. · Anxiety that can cause you to blush, sweat, and feel shaky.   · A heartbeat that is faster than normal.  · A hard time focusing. Phobias  Symptoms may include:  · More fear than most people of being around an object, being in a situation, or doing an activity. You might also be stressed about the chance of being around the thing you fear. · Worry about losing control, panicking, fainting, or having physical symptoms like a faster heartbeat when you are around the situation or object. How are these disorders treated? Anxiety disorders can be treated with medicines or counseling. A combination of both may be used. Medicines may include:  · Antidepressants. These may help your symptoms by keeping chemicals in your brain in balance. · Benzodiazepines. These may give you short-term relief of your symptoms. Some people use cognitive-behavioral therapy. A therapist helps you learn to change stressful or bad thoughts into helpful thoughts. Lead a healthy lifestyle  A healthy lifestyle may help you feel better. · Get at least 30 minutes of exercise on most days of the week. Walking is a good choice. · Eat a healthy diet. Include fruits, vegetables, lean proteins, and whole grains in your diet each day. · Try to go to bed at the same time every night. Try for 8 hours of sleep a night. · Find ways to manage stress. Try relaxation exercises. · Avoid alcohol and illegal drugs. Follow-up care is a key part of your treatment and safety. Be sure to make and go to all appointments, and call your doctor if you are having problems. It's also a good idea to know your test results and keep a list of the medicines you take. Where can you learn more? Go to http://maricruz-katy.info/. Enter T973 in the search box to learn more about \"Learning About Anxiety Disorders. \"  Current as of: September 11, 2018  Content Version: 11.9  © 2682-6405 Healthwise, Incorporated. Care instructions adapted under license by [employer] (which disclaims liability or warranty for this information).  If you have questions about a medical condition or this instruction, always ask your healthcare professional. [address] any warranty or liability for your use of this information. Gastroparesis: Care Instructions  Your Care Instructions    When you have gastroparesis, your stomach takes a lot longer to empty. This delay can cause belly pain, bloating, and belching. It also can cause hiccups, heartburn, nausea or vomiting. You may not feel like eating. These symptoms may come and go. They most often occur during and after meals. You may feel full after only a few bites of food. This condition occurs when the nerves to the stomach don't work properly. Diabetes is the most common cause of this nerve damage. Gastroparesis can make it harder to control your blood sugar levels. But keeping your blood sugar levels under control may help with your symptoms. Parkinson's disease, stroke, and some medicines can also cause this condition. Home treatment can often help. Follow-up care is a key part of your treatment and safety. Be sure to make and go to all appointments, and call your doctor if you are having problems. It's also a good idea to know your test results and keep a list of the medicines you take. How can you care for yourself at home? · Eat several small meals each day rather than three large meals. · Eat foods that are low in fiber and fat. · If your doctor suggests it, take medicines that help the stomach empty more quickly. These are called motility agents. When should you call for help? Call your doctor now or seek immediate medical care if:    · You are vomiting.     · You have new or worse belly pain.     · You have a fever.     · You cannot pass stools or gas.    Watch closely for changes in your health, and be sure to contact your doctor if you have any problems. Where can you learn more? Go to http://maricruz-katy.info/.   Enter M106 in the search box to learn more about \"Gastroparesis: Care Instructions. \"  Current as of: March 27, 2018  Content Version: 11.9  © 8324-5777 [employer]. Care instructions adapted under license by [employer] (which disclaims liability or warranty for this information). If you have questions about a medical condition or this instruction, always ask your healthcare professional. [address] any warranty or liability for your use of this information.

## 2019-03-16 LAB
ALBUMIN SERPL-MCNC: 4.3 G/DL (ref 3.5–5.5)
ALBUMIN/GLOB SERPL: 1.7 {RATIO} (ref 1.2–2.2)
ALP SERPL-CCNC: 40 IU/L (ref 39–117)
ALT SERPL-CCNC: 17 IU/L (ref 0–32)
AST SERPL-CCNC: 21 IU/L (ref 0–40)
BASOPHILS # BLD AUTO: 0 X10E3/UL (ref 0–0.2)
BASOPHILS NFR BLD AUTO: 1 %
BILIRUB SERPL-MCNC: 0.2 MG/DL (ref 0–1.2)
BUN SERPL-MCNC: 10 MG/DL (ref 6–20)
BUN/CREAT SERPL: 15 (ref 9–23)
CALCIUM SERPL-MCNC: 9.3 MG/DL (ref 8.7–10.2)
CHLORIDE SERPL-SCNC: 103 MMOL/L (ref 96–106)
CO2 SERPL-SCNC: 24 MMOL/L (ref 20–29)
CREAT SERPL-MCNC: 0.66 MG/DL (ref 0.57–1)
EOSINOPHIL # BLD AUTO: 0.3 X10E3/UL (ref 0–0.4)
EOSINOPHIL NFR BLD AUTO: 6 %
ERYTHROCYTE [DISTWIDTH] IN BLOOD BY AUTOMATED COUNT: 14.1 % (ref 12.3–15.4)
GLOBULIN SER CALC-MCNC: 2.6 G/DL (ref 1.5–4.5)
GLUCOSE SERPL-MCNC: 72 MG/DL (ref 65–99)
HCT VFR BLD AUTO: 37 % (ref 34–46.6)
HGB BLD-MCNC: 12.2 G/DL (ref 11.1–15.9)
IMM GRANULOCYTES # BLD AUTO: 0 X10E3/UL (ref 0–0.1)
IMM GRANULOCYTES NFR BLD AUTO: 0 %
LYMPHOCYTES # BLD AUTO: 2.3 X10E3/UL (ref 0.7–3.1)
LYMPHOCYTES NFR BLD AUTO: 43 %
MCH RBC QN AUTO: 28.6 PG (ref 26.6–33)
MCHC RBC AUTO-ENTMCNC: 33 G/DL (ref 31.5–35.7)
MCV RBC AUTO: 87 FL (ref 79–97)
MONOCYTES # BLD AUTO: 0.4 X10E3/UL (ref 0.1–0.9)
MONOCYTES NFR BLD AUTO: 8 %
NEUTROPHILS # BLD AUTO: 2.2 X10E3/UL (ref 1.4–7)
NEUTROPHILS NFR BLD AUTO: 42 %
PLATELET # BLD AUTO: 258 X10E3/UL (ref 150–379)
POTASSIUM SERPL-SCNC: 4.4 MMOL/L (ref 3.5–5.2)
PROT SERPL-MCNC: 6.9 G/DL (ref 6–8.5)
RBC # BLD AUTO: 4.26 X10E6/UL (ref 3.77–5.28)
SODIUM SERPL-SCNC: 140 MMOL/L (ref 134–144)
WBC # BLD AUTO: 5.3 X10E3/UL (ref 3.4–10.8)

## 2019-04-08 NOTE — PROGRESS NOTES
Discharge Planner OT   Patient plan for discharge: Home with assist  Current status: Pt completing total body shower mod I and completing all dressing techniques using compensatory techniques as needed for precautions.  Pt also ambulating ~350 ft outside during late AM session  Barriers to return to prior living situation: Post surgical precautions  Recommendations for discharge: Home with assist  Rationale for recommendations: Pt may benefit from some assistance with heavy IADL upon discharge       Entered by: Buster Nicolas 04/08/2019 11:49 AM        Results released to patient via [employer]. All labs are stable or at goal for her.

## 2019-06-05 ENCOUNTER — NURSE TRIAGE (OUTPATIENT)
Dept: OTHER | Facility: CLINIC | Age: 24
End: 2019-06-05
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
## 2019-09-09 ENCOUNTER — OFFICE VISIT (OUTPATIENT)
Dept: FAMILY MEDICINE CLINIC | Age: 24
End: 2019-09-09

## 2019-09-09 ENCOUNTER — HOSPITAL ENCOUNTER (OUTPATIENT)
Dept: GENERAL RADIOLOGY | Age: 24
Discharge: HOME | End: 2019-09-09
Payer: COMMERCIAL

## 2019-09-09 VITALS
RESPIRATION RATE: 16 BRPM | HEART RATE: 79 BPM | WEIGHT: 105.6 LBS | SYSTOLIC BLOOD PRESSURE: 89 MMHG | OXYGEN SATURATION: 100 % | DIASTOLIC BLOOD PRESSURE: 60 MMHG | BODY MASS INDEX: 18.71 KG/M2 | HEIGHT: 63 IN | TEMPERATURE: 97.2 F

## 2019-09-09 DIAGNOSIS — S63.502A SPRAIN OF LEFT WRIST, INITIAL ENCOUNTER: Primary | ICD-10-CM

## 2019-09-09 DIAGNOSIS — S63.502A SPRAIN OF LEFT WRIST, INITIAL ENCOUNTER: ICD-10-CM

## 2019-09-09 PROCEDURE — 73110 X-RAY EXAM OF WRIST: CPT

## 2019-09-09 NOTE — PROGRESS NOTES
Please call the patient on mobile number and let her know there is no fracture of the wrist.  Continue the treatment discussed; Rest, ice, elevate and wear the wrist splint until pain resolves.   Thank you

## 2019-09-09 NOTE — PATIENT INSTRUCTIONS
Wrist Sprain: Care Instructions  Your Care Instructions    Your wrist hurts because you have stretched or torn ligaments, which connect the bones in your wrist.  Wrist sprains usually take from 2 to 10 weeks to heal, but some take longer. Usually, the more pain you have, the more severe your wrist sprain is and the longer it will take to heal. You can heal faster and regain strength in your wrist with good home treatment. Follow-up care is a key part of your treatment and safety. Be sure to make and go to all appointments, and call your doctor if you are having problems. It's also a good idea to know your test results and keep a list of the medicines you take. How can you care for yourself at home? · Prop up your arm on a pillow when you ice it or anytime you sit or lie down for the next 3 days. Try to keep your wrist above the level of your heart. This will help reduce swelling. · Put ice or cold packs on your wrist for 10 to 20 minutes at a time. Try to do this every 1 to 2 hours for the next 3 days (when you are awake) or until the swelling goes down. Put a thin cloth between the ice pack and your skin. · After 2 or 3 days, if your swelling is gone, apply a heating pad set on low or a warm cloth to your wrist. This helps keep your wrist flexible. Some doctors suggest that you go back and forth between hot and cold. · If you have an elastic bandage, keep it on for the next 24 to 36 hours. The bandage should be snug but not so tight that it causes numbness or tingling. To rewrap the wrist, wrap the bandage around the hand a few times, beginning at the fingers. Then wrap it around the hand between the thumb and index finger, ending by circling the wrist several times. · If your doctor gave you a splint or brace, wear it as directed to protect your wrist until it has healed. · Take pain medicines exactly as directed. ? If the doctor gave you a prescription medicine for pain, take it as prescribed. ?  If you are not taking a prescription pain medicine, ask your doctor if you can take an over-the-counter medicine. · Try not to use your injured wrist and hand. When should you call for help? Call your doctor now or seek immediate medical care if:    · Your hand or fingers are cool or pale or change color.    Watch closely for changes in your health, and be sure to contact your doctor if:    · Your pain gets worse.     · Your wrist has not improved after 1 week. Where can you learn more? Go to http://maricruz-katy.info/. Enter G541 in the search box to learn more about \"Wrist Sprain: Care Instructions. \"  Current as of: September 20, 2018  Content Version: 12.1  © 1822-2447 Healthwise, Incorporated. Care instructions adapted under license by [employer] (which disclaims liability or warranty for this information). If you have questions about a medical condition or this instruction, always ask your healthcare professional. [address] any warranty or liability for your use of this information.

## 2019-09-09 NOTE — PROGRESS NOTES
Two pt identifiers confirmed. Informed pt per Mariano Hartley Np, that there is no fracture of the wrist and to continue the tx as discussed until pain resolves. Informed pt to call back in 1 wk if issues were not resolved. Pt verbalized understanding of information discussed w/ no further questions at this time.
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
## 2019-10-04 DIAGNOSIS — Z76.0 MEDICATION REFILL: ICD-10-CM

## 2019-10-04 DIAGNOSIS — K31.84 GASTROPARESIS: ICD-10-CM

## 2019-10-04 RX ORDER — PANTOPRAZOLE SODIUM 20 MG/1
20 TABLET, DELAYED RELEASE ORAL DAILY
Qty: 90 TAB | Refills: 1 | Status: SHIPPED | OUTPATIENT
Start: 2019-10-04 | End: 2020-03-17 | Stop reason: SDUPTHER

## 2019-10-04 NOTE — TELEPHONE ENCOUNTER
Requested Prescriptions     Pending Prescriptions Disp Refills    pantoprazole (PROTONIX) 20 mg tablet 90 Tab 3     Sig: Take 1 Tab by mouth daily.      [address] [place] [address] [place]  [phone]

## 2020-03-16 ENCOUNTER — TELEPHONE (OUTPATIENT)
Dept: INTERNAL MEDICINE CLINIC | Age: 25
End: 2020-03-16

## 2020-03-16 NOTE — TELEPHONE ENCOUNTER
Pt. Informed we are diverting sick Pt's to offsite screening so she has decided to keep her appt. Tomorrow with Dr Jen Ryan.

## 2020-03-16 NOTE — TELEPHONE ENCOUNTER
Pt wants to know if she can get  Her labs order  and cancel her appt  On [id number]  Can someone call pt at [phone]

## 2020-03-17 ENCOUNTER — OFFICE VISIT (OUTPATIENT)
Dept: INTERNAL MEDICINE CLINIC | Age: 25
End: 2020-03-17

## 2020-03-17 VITALS
OXYGEN SATURATION: 98 % | WEIGHT: 107 LBS | BODY MASS INDEX: 18.96 KG/M2 | HEIGHT: 63 IN | SYSTOLIC BLOOD PRESSURE: 96 MMHG | TEMPERATURE: 98.1 F | HEART RATE: 91 BPM | DIASTOLIC BLOOD PRESSURE: 56 MMHG | RESPIRATION RATE: 18 BRPM

## 2020-03-17 DIAGNOSIS — Z00.00 ROUTINE GENERAL MEDICAL EXAMINATION AT A HEALTH CARE FACILITY: Primary | ICD-10-CM

## 2020-03-17 DIAGNOSIS — Z76.0 MEDICATION REFILL: ICD-10-CM

## 2020-03-17 DIAGNOSIS — K64.9 HEMORRHOIDS, UNSPECIFIED HEMORRHOID TYPE: ICD-10-CM

## 2020-03-17 DIAGNOSIS — K31.84 GASTROPARESIS: ICD-10-CM

## 2020-03-17 DIAGNOSIS — B00.1 COLD SORE: ICD-10-CM

## 2020-03-17 RX ORDER — VALACYCLOVIR HYDROCHLORIDE 1 G/1
2000 TABLET, FILM COATED ORAL 2 TIMES DAILY
Qty: 30 TAB | Refills: 0 | Status: SHIPPED | OUTPATIENT
Start: 2020-03-17 | End: 2020-03-18

## 2020-03-17 RX ORDER — PANTOPRAZOLE SODIUM 20 MG/1
20 TABLET, DELAYED RELEASE ORAL DAILY
Qty: 90 TAB | Refills: 3 | Status: SHIPPED | OUTPATIENT
Start: 2020-03-17 | End: 2020-09-08

## 2020-03-17 RX ORDER — HYDROCORTISONE 25 MG/G
CREAM TOPICAL
Qty: 30 G | Refills: 0 | Status: SHIPPED | OUTPATIENT
Start: 2020-03-17 | End: 2020-09-08

## 2020-03-17 NOTE — PROGRESS NOTES
Subjective:   Turner Roach is here today for a full physical.    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 Health Maintenance  Immunizations:    Influenza: up to date. Tetanus: up to date. .   Cancer screening:    Cervical: reviewed guidelines, UTD, done by OBGYN, records requested. Catarina Benítez  She is concerned she has a new cold sore that started today and no known exposure. Patient Care Team:  Bridger Mann MD as PCP - General (Internal Medicine)  Bridger Mann MD as PCP - [facility] Empaneled Provider     The following sections were reviewed & updated as appropriate: PMH, PSH, FH, and SH. Patient Active Problem List   Diagnosis Code    Gastroparesis K31.84    Underweight due to inadequate caloric intake R63.6    JOAQUIN (generalized anxiety disorder) F41.1          Prior to Admission medications    Medication Sig Start Date End Date Taking? Authorizing Provider   levonorgestrel-ethinyl estradiol (LEVORA-28) 0.15-0.03 mg tab Take  by mouth. Yes Provider, Historical   pantoprazole (PROTONIX) 20 mg tablet Take 1 Tab by mouth daily. 10/4/19   Bridger Mann MD   metoclopramide HCl (REGLAN) 10 mg tablet Take 10 mg by mouth Before breakfast, lunch, and dinner. Provider, Historical          Allergies   Allergen Reactions    Latex Itching    Avocado Rash     NOT ALLERGIC          Review of Systems   Constitutional: Negative for chills, fever and malaise/fatigue. HENT: Negative for congestion and sore throat. Eyes: Negative for blurred vision and double vision. Respiratory: Negative for cough, shortness of breath and wheezing. Cardiovascular: Negative for chest pain, palpitations and leg swelling. Gastrointestinal: Negative for abdominal pain, blood in stool, constipation, diarrhea, heartburn, nausea and vomiting. Genitourinary: Negative for dysuria, frequency and urgency. Musculoskeletal: Negative for back pain, joint pain and myalgias. Skin: Negative for rash. Neurological: Negative for dizziness, sensory change, focal weakness and headaches. Psychiatric/Behavioral: Negative for depression. The patient is not nervous/anxious and does not have insomnia. Objective:   Physical Exam  Vitals signs and nursing note reviewed. Constitutional:       General: She is not in acute distress. Appearance: She is well-developed. HENT:      Head: Normocephalic and atraumatic. Right Ear: Tympanic membrane and ear canal normal.      Left Ear: Tympanic membrane and ear canal normal.      Nose: Nose normal.   Eyes:      Conjunctiva/sclera: Conjunctivae normal.      Pupils: Pupils are equal, round, and reactive to light. Neck:      Musculoskeletal: Normal range of motion. Thyroid: No thyromegaly. Cardiovascular:      Rate and Rhythm: Normal rate and regular rhythm. Heart sounds: Normal heart sounds. No murmur. Pulmonary:      Effort: Pulmonary effort is normal.      Breath sounds: Normal breath sounds. Chest:      Comments: Breast exam: breasts appear normal, no suspicious masses, no skin or nipple changes or axillary nodes. Abdominal:      General: Bowel sounds are normal.      Palpations: Abdomen is soft. There is no mass. Tenderness: There is no abdominal tenderness. Lymphadenopathy:      Cervical: No cervical adenopathy. Skin:     Findings: No rash. Neurological:      Cranial Nerves: No cranial nerve deficit. Sensory: No sensory deficit. Visit Vitals  BP 96/56 (BP 1 Location: Right arm)   Pulse 91   Temp 98.1 °F (36.7 °C) (Oral)   Resp 18   Ht 5' 3\" (1.6 m)   Wt 107 lb (48.5 kg)   LMP 02/25/2020   SpO2 98%   BMI 18.95 kg/m²         Diana Pal is a 26 y.o. female who was seen in clinic today (3/17/2020) for a full physical.       Assessment & Plan:   Encounter Diagnoses   Name Primary?  Routine general medical examination at a health care facility   Health maintenance reviewed and updated with patient today at visit. Yes    Medication refill     Gastroparesis  Continue protonix and med refilled.  Cold sore  Counseled and started on valtrex     Hemorrhoids, unspecified hemorrhoid type  anusol hc prn and counseled not for prolonged use. Orders Placed This Encounter    CBC WITH AUTOMATED DIFF    LIPID PANEL    METABOLIC PANEL, COMPREHENSIVE    pantoprazole (Protonix) 20 mg tablet    valACYclovir (Valtrex) 1 gram tablet    hydrocortisone (ANUSOL-HC) 2.5 % rectal cream   follow up 1 year physical    Advised her to call back or return to office if symptoms worsen/change/persist.  Discussed expected course/resolution/complications of diagnosis in detail with patient. Medication risks/benefits/costs/interactions/alternatives discussed with patient. She was given an after visit summary which includes diagnoses, current medications, & vitals. She expressed understanding with the diagnosis and plan. Aspects of this note may have been generated using voice recognition software. Despite editing, there may be some syntax errors.        Becki Madera MD

## 2020-03-24 LAB
ALBUMIN SERPL-MCNC: 4.9 G/DL (ref 3.9–5)
ALBUMIN/GLOB SERPL: 1.9 {RATIO} (ref 1.2–2.2)
ALP SERPL-CCNC: 50 IU/L (ref 39–117)
ALT SERPL-CCNC: 17 IU/L (ref 0–32)
AST SERPL-CCNC: 24 IU/L (ref 0–40)
BASOPHILS # BLD AUTO: 0 X10E3/UL (ref 0–0.2)
BASOPHILS NFR BLD AUTO: 1 %
BILIRUB SERPL-MCNC: 0.4 MG/DL (ref 0–1.2)
BUN SERPL-MCNC: 7 MG/DL (ref 6–20)
BUN/CREAT SERPL: 9 (ref 9–23)
CALCIUM SERPL-MCNC: 10.1 MG/DL (ref 8.7–10.2)
CHLORIDE SERPL-SCNC: 104 MMOL/L (ref 96–106)
CHOLEST SERPL-MCNC: 189 MG/DL (ref 100–199)
CO2 SERPL-SCNC: 22 MMOL/L (ref 20–29)
CREAT SERPL-MCNC: 0.77 MG/DL (ref 0.57–1)
EOSINOPHIL # BLD AUTO: 0.3 X10E3/UL (ref 0–0.4)
EOSINOPHIL NFR BLD AUTO: 5 %
ERYTHROCYTE [DISTWIDTH] IN BLOOD BY AUTOMATED COUNT: 12.6 % (ref 11.7–15.4)
GLOBULIN SER CALC-MCNC: 2.6 G/DL (ref 1.5–4.5)
GLUCOSE SERPL-MCNC: 85 MG/DL (ref 65–99)
HCT VFR BLD AUTO: 42.2 % (ref 34–46.6)
HDLC SERPL-MCNC: 61 MG/DL
HGB BLD-MCNC: 14.1 G/DL (ref 11.1–15.9)
IMM GRANULOCYTES # BLD AUTO: 0 X10E3/UL (ref 0–0.1)
IMM GRANULOCYTES NFR BLD AUTO: 0 %
LDLC SERPL CALC-MCNC: 109 MG/DL (ref 0–99)
LYMPHOCYTES # BLD AUTO: 2.3 X10E3/UL (ref 0.7–3.1)
LYMPHOCYTES NFR BLD AUTO: 41 %
MCH RBC QN AUTO: 29.1 PG (ref 26.6–33)
MCHC RBC AUTO-ENTMCNC: 33.4 G/DL (ref 31.5–35.7)
MCV RBC AUTO: 87 FL (ref 79–97)
MONOCYTES # BLD AUTO: 0.4 X10E3/UL (ref 0.1–0.9)
MONOCYTES NFR BLD AUTO: 7 %
NEUTROPHILS # BLD AUTO: 2.7 X10E3/UL (ref 1.4–7)
NEUTROPHILS NFR BLD AUTO: 46 %
PLATELET # BLD AUTO: 309 X10E3/UL (ref 150–450)
POTASSIUM SERPL-SCNC: 4.6 MMOL/L (ref 3.5–5.2)
PROT SERPL-MCNC: 7.5 G/DL (ref 6–8.5)
RBC # BLD AUTO: 4.84 X10E6/UL (ref 3.77–5.28)
SODIUM SERPL-SCNC: 140 MMOL/L (ref 134–144)
TRIGL SERPL-MCNC: 93 MG/DL (ref 0–149)
VLDLC SERPL CALC-MCNC: 19 MG/DL (ref 5–40)
WBC # BLD AUTO: 5.7 X10E3/UL (ref 3.4–10.8)

## 2020-09-08 ENCOUNTER — OFFICE VISIT (OUTPATIENT)
Dept: URGENT CARE | Age: 25
End: 2020-09-08
Payer: COMMERCIAL

## 2020-09-08 VITALS — OXYGEN SATURATION: 96 % | HEART RATE: 81 BPM | TEMPERATURE: 98.4 F | RESPIRATION RATE: 18 BRPM

## 2020-09-08 DIAGNOSIS — Z20.822 EXPOSURE TO COVID-19 VIRUS: Primary | ICD-10-CM

## 2020-09-08 PROCEDURE — S9083 URGENT CARE CENTER GLOBAL: HCPCS | Performed by: NURSE PRACTITIONER

## 2020-09-08 NOTE — PROGRESS NOTES
Subjective: (As above and below)       This patient was seen in Flu Clinic at [address] [place] [address] [facility]gent [facility] while in their vehicle due to COVID-19 pandemic with PPE and focused examination in order to decrease community viral transmission. The patient/guardian gave verbal consent to treat. Chief Complaint   Patient presents with    Covid Testing     No sx. Pt would like to be retested for Octavia Joel is a 26 y.o. female who presents for COVID-19 testing  Required for: work  Currently has not tried any therapies and denies any symptoms at this point in time. Feels well otherwise. Recent travel: no  Known Exposure to COVID-19: yes  Known flu or strep contact: no        ROS- negative for dizziness, cough, SOB, rhinorrhea, myalgias, n/v/d, rashes, headaches, fevers, chills, chest pains. Review of Systems - negative except as listed above    Reviewed PmHx, RxHx, FmHx, SocHx, AllgHx and updated in chart. History reviewed. No pertinent family history. Past Medical History:   Diagnosis Date    Anemia     Gastroparesis 12/12/2017    Ovarian cyst 2016      Social History     Socioeconomic History    Marital status:      Spouse name: Not on file    Number of children: Not on file    Years of education: Not on file    Highest education level: Not on file   Tobacco Use    Smoking status: Never Smoker    Smokeless tobacco: Never Used   Substance and Sexual Activity    Alcohol use: No     Alcohol/week: 0.0 standard drinks    Drug use: No    Sexual activity: Never     Comment: single no children          Current Outpatient Medications   Medication Sig    levonorgestrel-ethinyl estradiol (LEVORA-28) 0.15-0.03 mg tab Take  by mouth. No current facility-administered medications for this visit.         Objective:     Vitals:    09/08/20 1237   Pulse: 81   Resp: 18   Temp: 98.4 °F (36.9 °C)   SpO2: 96%       Physical Exam  General appearance  appears well hydrated and does not appear toxic, no acute distress  Eyes - EOMs intact. Non injected. No scleral icterus   Ears - no external swelling  Nose - No purulent drainage  Neck/Lymphatics  trachea midline, full AROM  Chest - normal breathing effort. No active cough heard. No audible wheezing. Heart - HR-see vitals  Skin - no observable rashes or pallor  Neurologic- alert and oriented x 3  Psychiatric- normal mood, behavior and though content. Assessment/ Plan:     1. Exposure to COVID-19 virus    - NOVEL CORONAVIRUS (COVID-19)  Previously tested. Requires re test for work. Follow up: We have reviewed, in detail, particular presentations/worsening/concerning signs and symptoms that may warrant seeking immediate care in the ED setting and patient verbalized being aware of what to watch for. For other non-severe changes, non-improvement or questions, patient aware to contact PCP office or consider a virtual online medical consultation. Reviewed with her that COVID-19 pandemic is an evolving situation with rapidly changing recommendations & guidelines. Medical decisions are made based on the the best information available at the time.   Recommended she stay tuned for updates published by trusted sources and to advise your PCP of any unexpected changes in clinical condition     Yolis Manning NP

## 2020-09-10 LAB — SARS-COV-2, NAA: NOT DETECTED

## 2020-10-19 ENCOUNTER — CLINICAL SUPPORT (OUTPATIENT)
Dept: PRIMARY CARE CLINIC | Age: 25
End: 2020-10-19
Payer: COMMERCIAL

## 2020-10-19 DIAGNOSIS — Z23 ENCOUNTER FOR IMMUNIZATION: ICD-10-CM

## 2020-10-19 PROCEDURE — 90471 IMMUNIZATION ADMIN: CPT | Performed by: NURSE PRACTITIONER

## 2020-10-19 PROCEDURE — 90686 IIV4 VACC NO PRSV 0.5 ML IM: CPT | Performed by: NURSE PRACTITIONER

## 2021-03-15 NOTE — TELEPHONE ENCOUNTER
Dr. Omer Lemhan, patient interested in free prenatal vitamins and iron per response from [address] [facility]. Order for [facility] Baby Box pending for your convenience. Thank you,  Rachel Oliva, [employer]  [address]  Department, toll free: [phone], option [facility] 66.      =======================================================================    POPULATION HEALTH CLINICAL PHARMACY REVIEW - Be Well With Baby    ERIKA Roy is a 23 y.o. female currently identified as interested in receiving a [facility] \"Baby Box\" containing a 1 year supply of prenatal vitamins and Ferrous gluconate 324mg form [facility]. Contents of Baby Box:   Welcome Letter   6 month supply of Certifi Prenatal Gummies (Take 2 gummies once daily) with 1 refill   6 month supply of Ferrous gluconate 324mg tablets (Take 1 tablet once daily) with 1 refill    Thank you  Maria Luisa Oliva, Phyllis  [address]  Department, toll free: [phone], option [facility] 66.

## 2021-05-06 ENCOUNTER — DOCUMENTATION ONLY (OUTPATIENT)
Dept: PHARMACY | Age: 26
End: 2021-05-06

## 2021-05-06 NOTE — LETTER
Baldo [address] 6 [facility] Caryn Arce 10 Phone: toll free [phone] option 7 Ms. Diana Pal 
[address] [place] [phone] Thanks so much for taking the first step towards better health. This letter is to inform you that we have received your enrollment form for the [facility] Well With Diabetes Program, but you are missing the following requirements or documentation: 1. Diagnosis of Diabetes Type One or Two - as per documentation from your Provider 2. Provider Visit for DM within the last 12 months 3. A1C Result within the last 12 months 4. Urine Albumin test yearly within the last 12 months To qualify for this program the above requirements must be met by 5/25/21 for enrollment into the program on 6/01/21. Results or visits obtained outside of [facility] will need to be provided by fax: [phone] or email: [email or web address] before the deadline in order to qualify for the program. 
  
If requirements are not met by the date listed above, you will be not be enrolled in the program.  
 
Baldo 2 Phone: toll free [phone], option 7 Email: [email or web address] Fax Number: [phone]

## 2021-05-06 NOTE — PROGRESS NOTES
Pharmacy Pop Care Documentation:   Patient is missing the following requirements: DX: DM Type One or Type Two; Provider Documentation of DM Visit; A1C; Urine Albumin. If completed by 5/25/21 patient will be eligible for enrollment in the DM Program on 6/01/21. Application Received: via [employer]. Letter mailed to patient.     Cristal Chandler, [employer] [facility]   Department, toll free: [phone], option 7

## 2021-06-22 LAB
CHOLEST SERPL-MCNC: 187 MG/DL
GLUCOSE SERPL-MCNC: 78 MG/DL (ref 65–100)
HDLC SERPL-MCNC: 62 MG/DL
LDLC SERPL CALC-MCNC: 110.6 MG/DL (ref 0–100)
TRIGL SERPL-MCNC: 72 MG/DL (ref ?–150)

## 2021-07-15 LAB
ANTIBODY SCREEN, EXTERNAL: NEGATIVE
HBSAG, EXTERNAL: NON REACTIVE
HEPATITIS C AB,   EXT: NON REACTIVE
RUBELLA, EXTERNAL: NORMAL
T. PALLIDUM, EXTERNAL: NEGATIVE
TYPE, ABO & RH, EXTERNAL: NORMAL

## 2021-10-10 ENCOUNTER — NURSE TRIAGE (OUTPATIENT)
Dept: OTHER | Facility: CLINIC | Age: 26
End: 2021-10-10

## 2021-10-10 NOTE — TELEPHONE ENCOUNTER
Brief description of triage:   Patient called because she is having severe throat pain with intermittent fevers. Onset of symptoms was a couple days ago and symptoms are getting worse. Pain is 10/10. Triage indicates for patient to go to ED. Reason for Disposition   [1] Drinking very little AND [2] dehydration suspected (e.g., no urine > 12 hours, very dry mouth, very lightheaded)    Answer Assessment - Initial Assessment Questions  1. ONSET: \"When did the throat start hurting? \" (Hours or days ago)       Last couple days    2. SEVERITY: \"How bad is the sore throat? \" (Scale 1-10; mild, moderate or severe)    - MILD (1-3):  doesn't interfere with eating or normal activities    - MODERATE (4-7): interferes with eating some solids and normal activities    - SEVERE (8-10):  excruciating pain, interferes with most normal activities    - SEVERE DYSPHAGIA: can't swallow liquids, drooling      Current pain is 10/10. Hurts to swallow or eat. 3. STREP EXPOSURE: \"Has there been any exposure to strep within the past week? \" If so, ask: \"What type of contact occurred? \"       Hasnt been exposed to strep that she is aware     4. VIRAL SYMPTOMS: Omie Helling there any symptoms of a cold, such as a runny nose, cough, hoarse voice or red eyes? \"       Runny nose yesterday but cleared. Hoarse voice. 5. FEVER: \"Do you have a fever? \" If so, ask: \"What is your temperature, how was it measured, and when did it start? \"      Intermittent fevers. Most recent temp is 101    6. PUS ON THE TONSILS: \"Is there pus on the tonsils in the back of your throat? \"      There are white spots on the back of her throat. 7. OTHER SYMPTOMS: \"Do you have any other symptoms? \" (e.g., difficulty breathing, headache, rash)      No difficult breathing, hurts to eat or swallow, headache present. No rashes. Patient stated she was lightheaded. 8. PREGNANCY: \"Is there any chance you are pregnant? \" \"When was your last menstrual period? \"      20 weeks pregnant. Protocols used: [facility]    Care advice provided, patient verbalizes understanding; denies any other questions or concerns; instructed to call back for any new or worsening symptoms. Attention Provider: Thank you for allowing me to participate in the care of your patient. The patient was connected to triage in response to symptoms provided. Please do not respond through this encounter as the response is not directed to a shared pool.

## 2021-12-08 ENCOUNTER — HOSPITAL ENCOUNTER (OUTPATIENT)
Age: 26
Setting detail: OBSERVATION
Discharge: HOME | End: 2021-12-08
Attending: OBSTETRICS & GYNECOLOGY | Admitting: OBSTETRICS & GYNECOLOGY
Payer: COMMERCIAL

## 2021-12-08 VITALS
DIASTOLIC BLOOD PRESSURE: 71 MMHG | HEART RATE: 83 BPM | RESPIRATION RATE: 16 BRPM | TEMPERATURE: 97.6 F | OXYGEN SATURATION: 99 % | SYSTOLIC BLOOD PRESSURE: 114 MMHG

## 2021-12-08 PROBLEM — N89.8 VAGINAL DISCHARGE DURING PREGNANCY IN THIRD TRIMESTER: Status: ACTIVE | Noted: 2021-12-08

## 2021-12-08 PROBLEM — O26.893 VAGINAL DISCHARGE DURING PREGNANCY IN THIRD TRIMESTER: Status: ACTIVE | Noted: 2021-12-08

## 2021-12-08 LAB
APPEARANCE UR: CLEAR
APTT PPP: 25.7 SEC (ref 22.1–31)
BACTERIA URNS QL MICRO: ABNORMAL /HPF
BILIRUB UR QL: NEGATIVE
COLOR UR: ABNORMAL
EPITH CASTS URNS QL MICRO: ABNORMAL /LPF
ERYTHROCYTE [DISTWIDTH] IN BLOOD BY AUTOMATED COUNT: 14 % (ref 11.5–14.5)
FIBRINOGEN PPP-MCNC: 412 MG/DL (ref 200–475)
GLUCOSE UR STRIP.AUTO-MCNC: NEGATIVE MG/DL
HCT VFR BLD AUTO: 31.3 % (ref 35–47)
HGB BLD-MCNC: 10.7 G/DL (ref 11.5–16)
HGB UR QL STRIP: NEGATIVE
INR PPP: 0.9 (ref 0.9–1.1)
KETONES UR QL STRIP.AUTO: NEGATIVE MG/DL
LEUKOCYTE ESTERASE UR QL STRIP.AUTO: NEGATIVE
MCH RBC QN AUTO: 31 PG (ref 26–34)
MCHC RBC AUTO-ENTMCNC: 34.2 G/DL (ref 30–36.5)
MCV RBC AUTO: 90.7 FL (ref 80–99)
NITRITE UR QL STRIP.AUTO: NEGATIVE
NRBC # BLD: 0 K/UL (ref 0–0.01)
NRBC BLD-RTO: 0 PER 100 WBC
PH UR STRIP: 7 [PH] (ref 5–8)
PLATELET # BLD AUTO: 225 K/UL (ref 150–400)
PMV BLD AUTO: 10 FL (ref 8.9–12.9)
PROT UR STRIP-MCNC: NEGATIVE MG/DL
PROTHROMBIN TIME: 9.4 SEC (ref 9–11.1)
RBC # BLD AUTO: 3.45 M/UL (ref 3.8–5.2)
RBC #/AREA URNS HPF: ABNORMAL /HPF (ref 0–5)
SP GR UR REFRACTOMETRY: <1.005 (ref 1–1.03)
THERAPEUTIC RANGE,PTTT: NORMAL SECS (ref 58–77)
UA: UC IF INDICATED,UAUC: ABNORMAL
UROBILINOGEN UR QL STRIP.AUTO: 0.2 EU/DL (ref 0.2–1)
WBC # BLD AUTO: 10.4 K/UL (ref 3.6–11)
WBC URNS QL MICRO: ABNORMAL /HPF (ref 0–4)

## 2021-12-08 PROCEDURE — 99283 EMERGENCY DEPT VISIT LOW MDM: CPT

## 2021-12-08 PROCEDURE — 76815 OB US LIMITED FETUS(S): CPT

## 2021-12-08 PROCEDURE — G0378 HOSPITAL OBSERVATION PER HR: HCPCS

## 2021-12-08 PROCEDURE — 36415 COLL VENOUS BLD VENIPUNCTURE: CPT

## 2021-12-08 PROCEDURE — 81001 URINALYSIS AUTO W/SCOPE: CPT

## 2021-12-08 PROCEDURE — 85730 THROMBOPLASTIN TIME PARTIAL: CPT

## 2021-12-08 PROCEDURE — 85027 COMPLETE CBC AUTOMATED: CPT

## 2021-12-08 NOTE — ED PROVIDER NOTES
Marcos Navarrete is a 22 y/o  @ 27w3d who presents to [facility] with c/o vaginal bleeding noted upon going to bathroom tonight. She denies any recent trauma or falls but admits to recent intercourse yesterday. She reports an uncomplicated Prenatal course. A prior pregnancy was complicated by 9 week loss. Chief Complaint   Patient presents with    Vaginal Bleeding     onset 18 today       Past Medical History:   Diagnosis Date    Anemia     Gastroparesis 2017    Ovarian cyst 2016       No past surgical history on file. No family history on file. Social History     Socioeconomic History    Marital status:      Spouse name: Not on file    Number of children: Not on file    Years of education: Not on file    Highest education level: Not on file   Occupational History    Not on file   Tobacco Use    Smoking status: Never Smoker    Smokeless tobacco: Never Used   Substance and Sexual Activity    Alcohol use: No     Alcohol/week: 0.0 standard drinks    Drug use: No    Sexual activity: Never     Comment: single no children   Other Topics Concern    Not on file   Social History Narrative    Not on file     Social Determinants of Health     Financial Resource Strain:     Difficulty of Paying Living Expenses: Not on file   Food Insecurity:     Worried About Running Out of Food in the Last Year: Not on file    Epi of Food in the Last Year: Not on file   Transportation Needs:     Lack of Transportation (Medical): Not on file    Lack of Transportation (Non-Medical):  Not on file   Physical Activity:     Days of Exercise per Week: Not on file    Minutes of Exercise per Session: Not on file   Stress:     Feeling of Stress : Not on file   Social Connections:     Frequency of Communication with Friends and Family: Not on file    Frequency of Social Gatherings with Friends and Family: Not on file    Attends Anabaptist Services: Not on file   [employer] of Clubs or Organizations: Not on file    Attends Club or Organization Meetings: Not on file    Marital Status: Not on file   Intimate Partner Violence:     Fear of Current or Ex-Partner: Not on file    Emotionally Abused: Not on file    Physically Abused: Not on file    Sexually Abused: Not on file   Housing Stability:     Unable to Pay for Housing in the Last Year: Not on file    Number of [place] in the Last Year: Not on file    Unstable Housing in the Last Year: Not on file         ALLERGIES: Latex    Review of Systems   Constitutional: Negative. HENT: Negative. Respiratory: Negative. Cardiovascular: Negative. Gastrointestinal: Negative. Genitourinary: Negative. Musculoskeletal: Negative. Skin: Negative. Neurological: Negative. Psychiatric/Behavioral: Negative. Vitals:    12/08/21 1832   BP: 114/71   Pulse: 83   Resp: 16   Temp: 97.6 °F (36.4 °C)   SpO2: 99%            Physical Exam  Vitals and nursing note reviewed. Exam conducted with a chaperone present. Constitutional:       Appearance: Normal appearance. HENT:      Head: Normocephalic and atraumatic. Abdominal:      General: There is distension. Palpations: Abdomen is soft. Tenderness: There is abdominal tenderness in the left lower quadrant. There is no right CVA tenderness, left CVA tenderness, guarding or rebound. Negative signs include Vance's sign, Rovsing's sign, McBurney's sign, psoas sign and obturator sign. Comments: Gravid Uterus C/w 28 weeks   Genitourinary:     General: Normal vulva. Labia:         Right: No rash, tenderness, lesion or injury. Left: No rash, tenderness, lesion or injury. Urethra: No prolapse, urethral pain, urethral swelling or urethral lesion. Vagina: No vaginal discharge. Musculoskeletal:      Cervical back: Normal range of motion and neck supple. Neurological:      Mental Status: She is alert.           MDM  Number of Diagnoses or Management Options     Amount and/or Complexity of Data Reviewed  Clinical lab tests: ordered and reviewed (CBC and COags)  Decide to obtain previous medical records or to obtain history from someone other than the patient: yes  Review and summarize past medical records: yes  Independent visualization of images, tracings, or specimens: yes (Bedside ultrasound- single IUP Vertex, adequate CARO, anterior placenta intact, 8/8 BPP  NST- FHR-130          Accelerations yes          Decelerations- no          Variability- moderate          Contractions- None       )    Risk of Complications, Morbidity, and/or Mortality  Presenting problems: high  Diagnostic procedures: moderate  Management options: high    Critical Care  Total time providing critical care: 30-74 minutes    Patient Progress  Patient progress: stable               Procedures    [unfilled]

## 2021-12-09 NOTE — DISCHARGE SUMMARY
Antepartum  Discharge Summary     Patient ID:  Debra Caldwell  [id number]  50 y.o.  1995    Admit date: 12/8/2021    Admitting Diagnosis: Vaginal discharge during pregnancy in third trimester [O26.893, N89.8]    Admission Diagnoses:   Active Problems:    Vaginal discharge during pregnancy in third trimester (12/8/2021)        Discharge date and time: 12/8/2021      Procedures for this admission:  * No surgery found *  NST  Bedside Ultrasound    Hospital Course:  Uncomplicated- no identified bleeding on exam or US, no clinical evidence of PTL or ABruption    Recent Results (from the past 24 hour(s))   CBC W/O DIFF    Collection Time: 12/08/21  7:35 PM   Result Value Ref Range    WBC 10.4 3.6 - 11.0 K/uL    RBC 3.45 (L) 3.80 - 5.20 M/uL    HGB 10.7 (L) 11.5 - 16.0 g/dL    HCT 31.3 (L) 35.0 - 47.0 %    MCV 90.7 80.0 - 99.0 FL    MCH 31.0 26.0 - 34.0 PG    MCHC 34.2 30.0 - 36.5 g/dL    RDW 14.0 11.5 - 14.5 %    PLATELET 558 137 - 117 K/uL    MPV 10.0 8.9 - 12.9 FL    NRBC 0.0 0  WBC    ABSOLUTE NRBC 0.00 0.00 - 0.01 K/uL   COAGULATION SCREEN    Collection Time: 12/08/21  7:35 PM   Result Value Ref Range    Fibrinogen 412 200 - 475 mg/dL    INR 0.9 0.9 - 1.1      Prothrombin time 9.4 9.0 - 11.1 sec    aPTT 25.7 22.1 - 31.0 sec    aPTT, therapeutic range     58.0 - 77.0 SECS   URINALYSIS W/ REFLEX CULTURE    Collection Time: 12/08/21  7:36 PM    Specimen: Urine   Result Value Ref Range    Color YELLOW/STRAW      Appearance CLEAR CLEAR      Specific gravity <1.005 1.003 - 1.030    pH (UA) 7.0 5.0 - 8.0      Protein Negative NEG mg/dL    Glucose Negative NEG mg/dL    Ketone Negative NEG mg/dL    Bilirubin Negative NEG      Blood Negative NEG      Urobilinogen 0.2 0.2 - 1.0 EU/dL    Nitrites Negative NEG      Leukocyte Esterase Negative NEG      WBC 0-4 0 - 4 /hpf    RBC 0-5 0 - 5 /hpf    Epithelial cells FEW FEW /lpf    Bacteria 1+ (A) NEG /hpf    UA:UC IF INDICATED CULTURE NOT INDICATED BY UA RESULT CNI       No results found for: Laraette Null, PROTU1, BJP1, CPE1, IMEL1, MET2     Condition on Discharge: stable    Disposition: Home or self care Home      Patient Instructions:     Current Discharge Medication List          Activity: No lifting, Driving, or Strenuous exercise for 2 weeks, No heavy lifting, pushing, pulling with the implant side for 2 months and No sex for 2 weeks  Diet: Regular Diet  Wound Care: None needed    Follow-up Information     Follow up With Specialties Details Why Contact Info    Frankie Doe MD Obstetrics & Gynecology   [facility] [place] [id number] [phone]            Follow-up with No orders of the defined types were placed in this encounter.    Folow up in AM with [facility] for MFM US schedule and office f/u PRN, return to [facility] if any further bleeding, pain, decreased Fetal Movement    Signed:  Luke Clarke MD  12/8/2021  9:47 PM

## 2021-12-09 NOTE — PROGRESS NOTES
1945 Bedside report received from D Seaver RN. 2139 Dr. Mainor Bryant aware of labs. Okay to discharge home.

## 2021-12-09 NOTE — PROGRESS NOTES
182 , A1 at 27.3 weeks, under services of Dr. Dipak Hills,  presents to [facility] 3 via wheelchair, with complaint of bright red vaginal bleeding onset 1800 today. Abdomen soft but having some left lower quadrant tenderness 4/10. Denies pain, frequency, pressure or urgency with urination. No active vaginal bleeding noted. Dr. Vidhi Bullard on call.  External fetal monitor applied   Dr. Vidhi Bullard notified of patient's arrival to [address] Up to void, clean catch UA obtained, no bleeding noted, urine appears clear   Dr. Vidhi Bullard at bedside, viewing FHR tracing, assessing patient   Sterile spec exam by Dr. Yana Quintanilla Abdominal ultrasound by Dr. Bishop Kidney drawn   1281 Transferred to [facility] # 14 ambulatory for continued observation, no vaginal bleeding noted at this time, peripad placed, will continue to observe   194 Bedside and Verbal shift change report given to KYLAH Jeter RN  by [facility]. Report included the following information SBAR, Intake/Output and Recent Results.

## 2021-12-09 NOTE — DISCHARGE INSTRUCTIONS
Patient Education        Vaginal Bleeding During Pregnancy: Care Instructions  Overview     It's common to have some vaginal bleeding when you are pregnant. In some cases, the bleeding isn't serious. And there aren't any more problems with the pregnancy. But sometimes bleeding is a sign of a more serious problem. This is more common if the bleeding is heavy or painful. Examples of more serious problems include miscarriage, an ectopic pregnancy, and a problem with the placenta. You may have to see your doctor again to be sure everything is okay. You may also need more tests to find the cause of the bleeding. Home treatment may be all you need. But it depends on what is causing the bleeding. Be sure to tell your doctor if you have any new symptoms or if your symptoms get worse. The doctor has checked you carefully, but problems can develop later. If you notice any problems or new symptoms, get medical treatment right away. Follow-up care is a key part of your treatment and safety. Be sure to make and go to all appointments, and call your doctor if you are having problems. It's also a good idea to know your test results and keep a list of the medicines you take. How can you care for yourself at home? · If your doctor prescribed medicines, take them exactly as directed. Call your doctor if you think you are having a problem with your medicine. · Do not have sex until your doctor says it's okay. · Do not put anything in your vagina until your doctor says it's okay. · Ask your doctor about other activities you can or can't do. · Get a lot of rest. Being pregnant can make you tired. · Eat a healthy diet. Include a lot of peas, beans, and leafy green vegetables. Talk to a dietitian if you need help planning your diet. · Do not use nonsteroidal anti-inflammatory drugs (NSAIDs), such as ibuprofen (Advil, Motrin), naproxen (Aleve), or aspirin, unless your doctor says it is okay. When should you call for help? Call 911 anytime you think you may need emergency care. For example, call if:    · You passed out (lost consciousness).     · You have severe vaginal bleeding. Call your doctor now or seek immediate medical care if:    · You have any vaginal bleeding.     · You have pain in your belly or pelvis.     · You think that you are in labor.     · You have a sudden release of fluid from your vagina.     · You notice that your baby has stopped moving or is moving much less than normal.   Watch closely for changes in your health, and be sure to contact your doctor if you have any questions or concerns. Where can you learn more? Go to http://www.gray.com/  Enter M035 in the search box to learn more about \"Vaginal Bleeding During Pregnancy: Care Instructions. \"  Current as of: June 16, 2021               Content Version: 13.0  © 3199-4115 [employer]wise, Incorporated. Care instructions adapted under license by [employer] (which disclaims liability or warranty for this information). If you have questions about a medical condition or this instruction, always ask your healthcare professional. [address] any warranty or liability for your use of this information.

## 2021-12-09 NOTE — H&P
Sandhya Matthew MD   Physician   Obstetrics & Gynecology   ED Provider Notes      Addendum   Date of Service:  21 184                 Expand All Collapse All          []Hide copied text    []Joe for details    Fabián Banuelos is a 22 y/o  @ 27w3d who presents to [facility] with c/o vaginal bleeding noted upon going to bathroom tonight. She denies any recent trauma or falls but admits to recent intercourse yesterday. She reports an uncomplicated Prenatal course. A prior pregnancy was complicated by 9 week loss.             Chief Complaint   Patient presents with    Vaginal Bleeding       onset 1800 today         Past Medical History        Past Medical History:   Diagnosis Date    Anemia      Gastroparesis 2017    Ovarian cyst 2016            Past Surgical History   No past surgical history on file. No family history on file.     Social History            Socioeconomic History    Marital status:        Spouse name: Not on file    Number of children: Not on file    Years of education: Not on file    Highest education level: Not on file   Occupational History    Not on file   Tobacco Use    Smoking status: Never Smoker    Smokeless tobacco: Never Used   Substance and Sexual Activity    Alcohol use: No       Alcohol/week: 0.0 standard drinks    Drug use: No    Sexual activity: Never       Comment: single no children   Other Topics Concern    Not on file   Social History Narrative    Not on file      Social Determinants of Health          Financial Resource Strain:     Difficulty of Paying Living Expenses: Not on file   Food Insecurity:     Worried About Running Out of Food in the Last Year: Not on file    Epi of Food in the Last Year: Not on file   Transportation Needs:     Lack of Transportation (Medical): Not on file    Lack of Transportation (Non-Medical):  Not on file   Physical Activity:     Days of Exercise per Week: Not on file    Minutes of Exercise per Session: Not on file   Stress:     Feeling of Stress : Not on file   Social Connections:     Frequency of Communication with Friends and Family: Not on file    Frequency of Social Gatherings with Friends and Family: Not on file    Attends Orthodoxy Services: Not on file    Active Member of Clubs or Organizations: Not on file    Attends Club or Organization Meetings: Not on file    Marital Status: Not on file   Intimate Partner Violence:     Fear of Current or Ex-Partner: Not on file    Emotionally Abused: Not on file    Physically Abused: Not on file    Sexually Abused: Not on file   Housing Stability:     Unable to Pay for Housing in the Last Year: Not on file    Number of [place] in the Last Year: Not on file    Unstable Housing in the Last Year: Not on file            ALLERGIES: Latex     Review of Systems   Constitutional: Negative. HENT: Negative. Respiratory: Negative. Cardiovascular: Negative. Gastrointestinal: Negative. Genitourinary: Negative. Musculoskeletal: Negative. Skin: Negative. Neurological: Negative. Psychiatric/Behavioral: Negative.              Vitals:     12/08/21 1832   BP: 114/71   Pulse: 83   Resp: 16   Temp: 97.6 °F (36.4 °C)   SpO2: 99%             Physical Exam  Vitals and nursing note reviewed. Exam conducted with a chaperone present. Constitutional:       Appearance: Normal appearance. HENT:      Head: Normocephalic and atraumatic. Abdominal:      General: There is distension. Palpations: Abdomen is soft. Tenderness: There is abdominal tenderness in the left lower quadrant. There is no right CVA tenderness, left CVA tenderness, guarding or rebound. Negative signs include Vance's sign, Rovsing's sign, McBurney's sign, psoas sign and obturator sign. Comments: Gravid Uterus C/w 28 weeks   Genitourinary:     General: Normal vulva. Labia:         Right: No rash, tenderness, lesion or injury.          Left: No rash, tenderness, lesion or injury. Urethra: No prolapse, urethral pain, urethral swelling or urethral lesion. Vagina: No vaginal discharge. Musculoskeletal:      Cervical back: Normal range of motion and neck supple.    Neurological:      Mental Status: She is alert.          MDM  Number of Diagnoses or Management Options     Amount and/or Complexity of Data Reviewed  Clinical lab tests: ordered and reviewed (CBC and COags)  Decide to obtain previous medical records or to obtain history from someone other than the patient: yes  Review and summarize past medical records: yes  Independent visualization of images, tracings, or specimens: yes (Bedside ultrasound- single IUP Vertex, adequate CARO, anterior placenta intact, 8/8 BPP  NST- FHR-130          Accelerations yes          Decelerations- no          Variability- moderate          Contractions- None       )     Risk of Complications, Morbidity, and/or Mortality  Presenting problems: high  Diagnostic procedures: moderate  Management options: high   Critical Care  Total time providing critical care: 30-74 minutes     Patient Progress  Patient progress: stable                  Procedures     [unfilled]          Revision History

## 2021-12-21 ENCOUNTER — OFFICE VISIT (OUTPATIENT)
Dept: INTERNAL MEDICINE CLINIC | Age: 26
End: 2021-12-21
Payer: COMMERCIAL

## 2021-12-21 VITALS
HEART RATE: 99 BPM | OXYGEN SATURATION: 100 % | TEMPERATURE: 97.8 F | HEIGHT: 63 IN | BODY MASS INDEX: 22.32 KG/M2 | DIASTOLIC BLOOD PRESSURE: 66 MMHG | WEIGHT: 126 LBS | RESPIRATION RATE: 16 BRPM | SYSTOLIC BLOOD PRESSURE: 101 MMHG

## 2021-12-21 DIAGNOSIS — Z00.00 ROUTINE GENERAL MEDICAL EXAMINATION AT A HEALTH CARE FACILITY: Primary | ICD-10-CM

## 2021-12-21 DIAGNOSIS — Z3A.29 29 WEEKS GESTATION OF PREGNANCY: ICD-10-CM

## 2021-12-21 PROBLEM — R63.6 UNDERWEIGHT DUE TO INADEQUATE CALORIC INTAKE: Status: RESOLVED | Noted: 2017-12-12 | Resolved: 2021-12-21

## 2021-12-21 PROBLEM — F41.1 GAD (GENERALIZED ANXIETY DISORDER): Status: RESOLVED | Noted: 2018-09-10 | Resolved: 2021-12-21

## 2021-12-21 PROBLEM — O26.893 VAGINAL DISCHARGE DURING PREGNANCY IN THIRD TRIMESTER: Status: RESOLVED | Noted: 2021-12-08 | Resolved: 2021-12-21

## 2021-12-21 PROBLEM — K31.84 GASTROPARESIS: Status: RESOLVED | Noted: 2017-12-12 | Resolved: 2021-12-21

## 2021-12-21 PROBLEM — N89.8 VAGINAL DISCHARGE DURING PREGNANCY IN THIRD TRIMESTER: Status: RESOLVED | Noted: 2021-12-08 | Resolved: 2021-12-21

## 2021-12-21 PROCEDURE — 99395 PREV VISIT EST AGE 18-39: CPT | Performed by: INTERNAL MEDICINE

## 2021-12-21 NOTE — PROGRESS NOTES
Chief Complaint   Patient presents with    Complete Physical         1. Have you been to the ER, urgent care clinic since your last visit? Hospitalized since your last visit? Yes When: 12/2021 Where: [facility] er  Reason for visit: vaginal bleeding    2. Have you seen or consulted any other health care providers outside of the [address] [place] [address] since your last visit? Include any pap smears or colon screening.  No

## 2021-12-21 NOTE — LETTER
12/21/2021 3:51 PM    Ms. Cintia Yanira Lim                Dear Dr. Srinivasan Whitney    Please fax us the most recent Pap Smear so that we may update the patient's records for continuity of care.      Our fax number: [phone]    Patient:   Rainer Cornell  1995                      Sincerely,      Gabino Prasad MD

## 2021-12-21 NOTE — PROGRESS NOTES
Rosa Smith is a 23 y.o. female who was seen in clinic today (12/21/2021) for a full physical.       Assessment & Plan:   Below is the assessment and plan developed based on review of pertinent history, physical exam, labs, studies, and medications. 1. Routine general medical examination at a health care facility  Health maintenance reviewed and updated with patient today at visit. 2. 29 weeks gestation of pregnancy  Overall doing very well and has had no complications. Follow-up 1 year for physical  Subjective:   Malini Lewis is here today for a full physical and 29 weeks pregnant. Since last visit: has gained 24 pounds. She eating healthy diet and exercise: walking/hiking    Health Maintenance  Immunizations:   Covid: due for booster  Influenza: up to date   Tetanus: up to date      Cancer screening:   Cervical: reviewed guidelines, up to date  Breast: reviewed guidelines, see Dr Oanh Jaimes. The following sections were reviewed & updated as appropriate: Problem List, Allergies, PMH, PSH, FH, and SH. Prior to Admission medications    Medication Sig Start Date End Date Taking? Authorizing Provider   PNV MR.65/FBDEHZG fum/folic ac (PRENATAL PO) Take 1 Tablet by mouth. Yes Provider, Historical   levonorgestrel-ethinyl estradiol (LEVORA-28) 0.15-0.03 mg tab Take  by mouth. Patient not taking: Reported on 12/8/2021 12/21/21  Provider, Historical          Review of Systems   Constitutional: Negative for fever, malaise/fatigue and weight loss. HENT: Negative for congestion and sore throat. Eyes: Negative. Respiratory: Negative for cough and shortness of breath. Cardiovascular: Positive for leg swelling (She notes a very slight amount of ankle swelling. When she is up on her feet more). Negative for chest pain. Gastrointestinal: Negative for abdominal pain, blood in stool, constipation, diarrhea, heartburn and nausea. Genitourinary: Negative for dysuria, frequency and urgency. Musculoskeletal: Negative for back pain and joint pain. Skin: Negative for rash. Neurological: Negative for dizziness, sensory change, focal weakness and headaches. Psychiatric/Behavioral: Negative for depression. Objective:   Physical Exam  Vitals and nursing note reviewed. Constitutional:       General: She is not in acute distress. Appearance: She is well-developed. HENT:      Head: Normocephalic and atraumatic. Right Ear: Tympanic membrane and ear canal normal.      Left Ear: Tympanic membrane and ear canal normal.      Nose: Nose normal.   Eyes:      Conjunctiva/sclera: Conjunctivae normal.      Pupils: Pupils are equal, round, and reactive to light. Neck:      Thyroid: No thyromegaly. Cardiovascular:      Rate and Rhythm: Normal rate and regular rhythm. Heart sounds: Normal heart sounds. No murmur heard. Pulmonary:      Effort: Pulmonary effort is normal.      Breath sounds: Normal breath sounds. Abdominal:      General: Bowel sounds are normal.      Palpations: Abdomen is soft. There is no mass. Tenderness: There is no abdominal tenderness. Comments: Gravid uterus   Musculoskeletal:      Cervical back: Normal range of motion. Right lower leg: Edema (trace) present. Left lower leg: Edema (trace) present. Lymphadenopathy:      Cervical: No cervical adenopathy. Skin:     Findings: No rash. Neurological:      Cranial Nerves: No cranial nerve deficit. Sensory: No sensory deficit.    Psychiatric:         Mood and Affect: Mood normal.            Visit Vitals  /66 (BP 1 Location: Left upper arm, BP Patient Position: Sitting, BP Cuff Size: Adult)   Pulse 99   Temp 97.8 °F (36.6 °C) (Temporal)   Resp 16   Ht 5' 3\" (1.6 m)   Wt 126 lb (57.2 kg)   SpO2 100%   Breastfeeding Unknown   BMI 22.32 kg/m²          Amy Prak MD

## 2022-02-08 LAB — GRBS, EXTERNAL: NEGATIVE

## 2022-02-22 ENCOUNTER — TRANSCRIBE ORDER (OUTPATIENT)
Dept: REGISTRATION | Age: 27
End: 2022-02-22

## 2022-02-22 ENCOUNTER — HOSPITAL ENCOUNTER (OUTPATIENT)
Dept: PREADMISSION TESTING | Age: 27
Discharge: HOME | End: 2022-02-22
Attending: OBSTETRICS & GYNECOLOGY
Payer: COMMERCIAL

## 2022-02-22 DIAGNOSIS — U07.1 COVID-19: ICD-10-CM

## 2022-02-22 DIAGNOSIS — U07.1 COVID-19: Primary | ICD-10-CM

## 2022-02-22 PROCEDURE — U0005 INFEC AGEN DETEC AMPLI PROBE: HCPCS

## 2022-02-23 LAB
SARS-COV-2, XPLCVT: NOT DETECTED
SOURCE, COVRS: NORMAL

## 2022-03-01 ENCOUNTER — HOSPITAL ENCOUNTER (EMERGENCY)
Age: 27
Discharge: HOME | End: 2022-03-01
Attending: OBSTETRICS & GYNECOLOGY
Payer: COMMERCIAL

## 2022-03-01 ENCOUNTER — NURSE TRIAGE (OUTPATIENT)
Dept: OTHER | Facility: CLINIC | Age: 27
End: 2022-03-01

## 2022-03-01 VITALS
DIASTOLIC BLOOD PRESSURE: 68 MMHG | SYSTOLIC BLOOD PRESSURE: 108 MMHG | TEMPERATURE: 97.9 F | RESPIRATION RATE: 14 BRPM | HEART RATE: 68 BPM

## 2022-03-01 PROCEDURE — 99283 EMERGENCY DEPT VISIT LOW MDM: CPT

## 2022-03-02 ENCOUNTER — PATIENT OUTREACH (OUTPATIENT)
Dept: OTHER | Age: 27
End: 2022-03-02

## 2022-03-02 NOTE — DISCHARGE INSTRUCTIONS
You came to the hospital because you thought you were in labor. This information may help you decide when you need to call your provider and return to the hospital.     Am I in Labor? ?  While each woman may feel contractions differently, these facts may help you decide if you are in true labor. A contraction is a painful tightening of the uterus. It may feel like the baby is sitting up, a bad backache or severe menstrual cramps. Sometimes women have contractions that do not cause cervical change- this is often called \"false labor. \"      SIGNS AND SYMPTOMS OF LABOR   Uterine cramping   Diarrhea   Increase or change in vaginal discharge   Low abdominal pressure   Dull low backache   Feeling like your baby is pushing down    When these symptoms are experienced. .. STOP what you are doing, lie on your side, drink two or three glasses of water or juice, and wait one hour. If the symptoms worsen call your care provider. If the symptoms go away, inform your care provider at the next visit that this happened. In TRUE LABOR contractions: In FALSE LABOR contractions:   * Occur regularly every 5 min or less * Occur irregularly   * Feel stronger and hurt more * Stay the same or space out   * Become stronger with walking * Strength stays the same or they hurt less  when walking   * Pinkish mucus or spotting maybe present          Call your provider if:   Regular, painful contractions every 5 minutes or less for one hour. Time your contractions.   You have a gush of fluid from your vagina.  Vaginal bleeding that is bright red, like a period (it is normal to have spotting after a vaginal exam or intercourse).   Your baby is not moving as much as usual- at least 4 fetal movements in 1 hour after drinking and resting on your side for 1 hour.  Fever 101 or above taken orally. Follow Up Appointment:  in the next few days with Dr. Jah Shearer at the office.       Medications: Continue prenatal medications and/or any new medications

## 2022-03-02 NOTE — PROGRESS NOTES
1940 Report received from [address] 1950 Dr. Eva Thompson at bedside. SVE 3/70/-3. Plan to discharge home. 2010 Discharged home with instructions.

## 2022-03-02 NOTE — ED TRIAGE NOTES
1925 Pt presents to [facility] for contractions since 1500 which have gotten stronger since then; denies LOF, VB, COVID symptoms/exposure to COVID + persons. 1940 Bedside and Verbal shift change report given to KYLAH Jeter RN (oncoming nurse) by Pastora Leiva RN (offgoing nurse). Report included the following information SBAR and ED Summary.

## 2022-03-02 NOTE — TELEPHONE ENCOUNTER
Pt is 39 + 2 days pregnant. Was seen at L&D triage and wanted to call us to let us know they had went to the hospital.     Per L&D they sent pt back home with early signs of labor and told to return when contractions become more intense. Reason for Disposition  Yanes Caller has already spoken with another triager or PCP AND has further questions AND triager able to answer questions.     Protocols used: NO CONTACT OR DUPLICATE CONTACT CALL-ADULT-

## 2022-03-02 NOTE — ED PROVIDER NOTES
Contractions          Chief Complaint   Patient presents with    Contractions     \"it got more regular since 3 pm\"   [id number] 48 2/7 with contrations every 5 minutes apart, mild. Denies leakage of fluid or vaginal bleeding. Notes active FM. No complaints. Past Medical History:   Diagnosis Date    Anemia     Gastroparesis 12/12/2017    Gastroparesis 12/12/2017    Ovarian cyst 2016       Past Surgical History:   Procedure Laterality Date    HX OTHER SURGICAL  2017    wisdom teeth         No family history on file. Social History     Socioeconomic History    Marital status:      Spouse name: Not on file    Number of children: Not on file    Years of education: Not on file    Highest education level: Not on file   Occupational History    Not on file   Tobacco Use    Smoking status: Never Smoker    Smokeless tobacco: Never Used   Vaping Use    Vaping Use: Never used   Substance and Sexual Activity    Alcohol use: No     Alcohol/week: 0.0 standard drinks    Drug use: No    Sexual activity: Never     Comment: single no children   Other Topics Concern     Service No    Blood Transfusions No    Caffeine Concern No    Occupational Exposure No    Hobby Hazards No    Sleep Concern No    Stress Concern No    Weight Concern No    Special Diet No    Back Care No    Exercise No    Bike Helmet No    Seat Belt No    Self-Exams No   Social History Narrative    Not on file     Social Determinants of Health     Financial Resource Strain:     Difficulty of Paying Living Expenses: Not on file   Food Insecurity:     Worried About Running Out of Food in the Last Year: Not on file    Epi of Food in the Last Year: Not on file   Transportation Needs:     Lack of Transportation (Medical): Not on file    Lack of Transportation (Non-Medical):  Not on file   Physical Activity:     Days of Exercise per Week: Not on file    Minutes of Exercise per Session: Not on file   Stress:     Feeling of Stress : Not on file   Social Connections:     Frequency of Communication with Friends and Family: Not on file    Frequency of Social Gatherings with Friends and Family: Not on file    Attends Worship Services: Not on file    Active Member of Clubs or Organizations: Not on file    Attends Club or Organization Meetings: Not on file    Marital Status: Not on file   Intimate Partner Violence:     Fear of Current or Ex-Partner: Not on file    Emotionally Abused: Not on file    Physically Abused: Not on file    Sexually Abused: Not on file   Housing Stability:     Unable to Pay for Housing in the Last Year: Not on file    Number of [place] in the Last Year: Not on file    Unstable Housing in the Last Year: Not on file         ALLERGIES: Latex    Review of Systems   Negative other than HPI    There were no vitals filed for this visit. Physical Exam   Gen: NAd  Resp: non labored  CV: well perfused  Abd: soft, gravid  Ext: no edema    Cervix: 7ZM/09%/-2, intact, cephalic    Baseline 203 moderate variability, + accels, no decels - REACTIVE    Contractions every 5 minutes  MDM     at 39 2/7 in latent labor  - reactive NST  - pt desires to lamaze  - desires to do early labor at home  - will go home and continue to labor and will return when contractions closer together and more uncomfortable.   - FM and labor precautions reviewed with pt and   - pt discharged home per request    Vijay Ascencio MD         Procedures    [unfilled]

## 2022-03-02 NOTE — PROGRESS NOTES
ECM attempted to reach patient for Transition of Care/Maternity CM call. HIPAA compliant message left requesting a return phone call at patients convenience. Will continue to follow. Pt called me back:    Patient eligible for [facility] Manager contacted the patient by telephone to discuss the maternity management program.  Patient agrees to care management services at this time. Verified name and  with patient as identifiers. Call within 2 business days of discharge: Yes     Last Discharge [address]       Complaint Diagnosis Description Type Department Provider    3/1/22 Contractions  ED (DISCHARGE) Lawanda Smith MD          Was this an external facility discharge? No Discharge Facility: [facility]      Risk Factors Identified: previous loss    Needs to be reviewed by the provider   none         Method of communication with provider : none    Does patient have OB/Gyn Selected? Yes    Discussed follow up appointments. If no appointment was previously scheduled, appointment scheduling offered: Yes  [facility] follow up appointment(s): No future appointments. [facility] follow up appointment(s): pt to report back to the hospital when she feels she is in active labor, discussed signs with pt  Dr. Jeane Fortune [place]    OB History:   OB History    Para Term  AB Living   1             SAB IAB Ectopic Molar Multiple Live Births           1        # Outcome Date GA Lbr Alhaji/2nd Weight Sex Delivery Anes PTL Lv   1A             1B Current                39w4d    Medication reconciliation was performed with patient, who verbalizes understanding of administration of home medications. Advised obtaining a 90-day supply of all daily and as-needed medications. Barriers/Support system:  spouse   Return to work planning?  Yes    2nd and 3rd Trimester Focused Assessment:   Healthy behavior review, Fetal movement-baby moving well mom reports hr from personal doppler was in 140's, Red flags: bleeding/leaking and Labor signs and symptoms-yes discussed with pt when to report back to the hospital when activew labor begins, discussed symptoms signs of this she is 3cm and 70% effaced at hospital visit 2 days ago   Birth planning: delivery at [facility]/breast feeding natural labor no epidural  Maternity Classes? Yes      Patient given an opportunity to ask questions and does not have any further questions or concerns at this time. Were discharge instructions available to patient? Yes. Reviewed appropriate site of care based on symptoms and resources available to patient including: Benefits related nurse triage line, When to call 911 and Condition related references. The patient agrees to contact the OB/Gyn office for questions related to their healthcare. Goals      Attends follow-up appointments as directed.  Educate and encourage importance of FU for prevention of complications or disease;   Assess the patient's relationship with a PCP and next FU visit scheduled;   Discuss importance of adherence to treatment plan and follow up visits;   Identify any barriers in transportation or access to FU appointments.  Assist patient with making FU appointments as needed;    It's also a good idea to know your test results.  Keep a list of the medicines you take.  Understands red flags post discharge. Call 911 anytime you think you may need emergency care. For example, call if:  Call your doctor now or seek immediate medical care if:    You passed out (lost consciousness). You have a seizure. You have severe vaginal bleeding. You have severe pain in your belly or pelvis. You have had fluid gushing or leaking from your vagina and you know or think the  umbilical cord is bulging into your vagina. If this happens, immediately get down on  your knees so your rear end (buttocks) is higher than your head.  This will decrease  the pressure on the cord until help arrives. Plan for follow-up call in 10-14 days based on severity of symptoms and risk factors.   Plan for next call: self management-f/u with pt post delivery of

## 2022-03-03 NOTE — PATIENT INSTRUCTIONS
Early Stage of Labor at Home: Care Instructions  Overview     If you came to the hospital while in early labor, your doctor may ask you to labor at home until your contractions are stronger. Many women stay at home during early labor. This is often the longest part of the birthing process. It may last up to 2 to 3 days. Contractions are mild to moderate and shorter (about 30 to 45 seconds). You can usually keep talking during them. Contractions may also be irregular, about 5 to 20 minutes apart. They may even stop for a while. It helps to stay as relaxed as you can during this time. You can spend some or all of your early labor at home or anywhere else you may be comfortable. If you live far from the hospital or birthing center, you may want to think about going somewhere nearby so you can get back to the hospital quickly. For some women, there may be benefits to staying home during early labor, such as avoiding medicines or procedures. As labor progresses, you'll shift from early labor to active labor. During this time, contractions get more intense. They occur more often, about every 2 to 3 minutes. They also last longer, about 50 to 70 seconds. You will feel them even when you change positions and walk or move around. It may be hard to tell if you are in active labor. If you aren't sure, call your doctor or midwife. As your labor progresses, check in with your doctor or midwife about when to come back to the hospital or birthing center. You may have special instructions if your water broke or you tested positive for group B strep. Follow-up care is a key part of your treatment and safety. Be sure to make and go to all appointments, and call your doctor if you are having problems. It's also a good idea to know your test results and keep a list of the medicines you take. How can you care for yourself at home? · Get support.  Having a support person with you from early labor until after childbirth can have a positive effect on childbirth. · Find distractions. During early labor, you can walk, play cards, watch TV, or listen to music to help take your mind off your contractions. · Ask your partner, labor , or  for a massage. Shoulder and low back massage during contractions may ease your pain. Strong massage of the back muscles (counterpressure) during contractions may help relieve the pain of back labor. Tell your labor  exactly where to push and how hard to push. · Use imagery. This means using your imagination to decrease your pain. For instance, to help manage pain, picture your contractions as waves rolling over you. Picture a peaceful place, such as a beach or mountain stream, to help you relax between contractions. · Change positions during labor. Walking, kneeling, or sitting on a big rubber ball (birth ball) are good options. · Use focused breathing techniques. Breathing in a rhythm can distract you from pain. · Take a warm shower or bath. Warm water may ease pain and stress. When should you call for help? Call 911  anytime you think you may need emergency care. For example, call if:    · You passed out (lost consciousness).     · You have a seizure.     · You have severe vaginal bleeding.     · You have severe pain in your belly or pelvis that doesn't get better between contractions.     · You have had fluid gushing or leaking from your vagina and you know or think the umbilical cord is bulging into your vagina. If this happens, immediately get down on your knees so your rear end (buttocks) is higher than your head. This will decrease the pressure on the cord until help arrives. Call your doctor now or seek immediate medical care if:    · You have new or worse signs of preeclampsia, such as:  ? Sudden swelling of your face, hands, or feet. ? New vision problems (such as dimness, blurring, or seeing spots).   ? A severe headache.     · You have any vaginal bleeding.     · You have belly pain or cramping.     · You have a fever.     · You have had regular contractions (with or without pain) for an hour. This means that you have 8 or more within 1 hour or 4 or more in 20 minutes after you change your position and drink fluids.     · You have a sudden release of fluid from your vagina.     · You have low back pain or pelvic pressure that does not go away.     · You notice that your baby has stopped moving or is moving much less than normal.   Watch closely for changes in your health, and be sure to contact your doctor if you have any problems. Where can you learn more? Go to http://www.gray.com/  Enter H024616 in the search box to learn more about \"Early Stage of Labor at Home: Care Instructions. \"  Current as of: June 16, 2021               Content Version: 13.0  © 1429-0803 [employer]. Care instructions adapted under license by [employer] (which disclaims liability or warranty for this information). If you have questions about a medical condition or this instruction, always ask your healthcare professional. [address] any warranty or liability for your use of this information.

## 2022-03-06 ENCOUNTER — HOSPITAL ENCOUNTER (INPATIENT)
Age: 27
LOS: 3 days | Discharge: HOME | End: 2022-03-09
Attending: OBSTETRICS & GYNECOLOGY | Admitting: OBSTETRICS & GYNECOLOGY
Payer: COMMERCIAL

## 2022-03-06 ENCOUNTER — ANESTHESIA EVENT (OUTPATIENT)
Dept: LABOR AND DELIVERY | Age: 27
End: 2022-03-06
Payer: COMMERCIAL

## 2022-03-06 ENCOUNTER — NURSE TRIAGE (OUTPATIENT)
Dept: OTHER | Facility: CLINIC | Age: 27
End: 2022-03-06

## 2022-03-06 ENCOUNTER — ANESTHESIA (OUTPATIENT)
Dept: LABOR AND DELIVERY | Age: 27
End: 2022-03-06
Payer: COMMERCIAL

## 2022-03-06 DIAGNOSIS — O26.893 ABDOMINAL PAIN DURING PREGNANCY, THIRD TRIMESTER: ICD-10-CM

## 2022-03-06 DIAGNOSIS — O42.90 AMNIOTIC FLUID LEAKING: ICD-10-CM

## 2022-03-06 DIAGNOSIS — R10.9 ABDOMINAL PAIN DURING PREGNANCY, THIRD TRIMESTER: ICD-10-CM

## 2022-03-06 DIAGNOSIS — Z3A.40 40 WEEKS GESTATION OF PREGNANCY: Primary | ICD-10-CM

## 2022-03-06 LAB
BASOPHILS # BLD: 0 K/UL (ref 0–0.1)
BASOPHILS NFR BLD: 1 % (ref 0–1)
DIFFERENTIAL METHOD BLD: ABNORMAL
EOSINOPHIL # BLD: 0.1 K/UL (ref 0–0.4)
EOSINOPHIL NFR BLD: 1 % (ref 0–7)
ERYTHROCYTE [DISTWIDTH] IN BLOOD BY AUTOMATED COUNT: 13.2 % (ref 11.5–14.5)
HCT VFR BLD AUTO: 31.8 % (ref 35–47)
HGB BLD-MCNC: 11.1 G/DL (ref 11.5–16)
IMM GRANULOCYTES # BLD AUTO: 0.1 K/UL (ref 0–0.04)
IMM GRANULOCYTES NFR BLD AUTO: 1 % (ref 0–0.5)
LYMPHOCYTES # BLD: 2 K/UL (ref 0.8–3.5)
LYMPHOCYTES NFR BLD: 23 % (ref 12–49)
MCH RBC QN AUTO: 30.4 PG (ref 26–34)
MCHC RBC AUTO-ENTMCNC: 34.9 G/DL (ref 30–36.5)
MCV RBC AUTO: 87.1 FL (ref 80–99)
MONOCYTES # BLD: 0.6 K/UL (ref 0–1)
MONOCYTES NFR BLD: 6 % (ref 5–13)
NEUTS SEG # BLD: 5.9 K/UL (ref 1.8–8)
NEUTS SEG NFR BLD: 68 % (ref 32–75)
NRBC # BLD: 0 K/UL (ref 0–0.01)
NRBC BLD-RTO: 0 PER 100 WBC
PLATELET # BLD AUTO: 169 K/UL (ref 150–400)
PMV BLD AUTO: 10.8 FL (ref 8.9–12.9)
RBC # BLD AUTO: 3.65 M/UL (ref 3.8–5.2)
WBC # BLD AUTO: 8.7 K/UL (ref 3.6–11)

## 2022-03-06 PROCEDURE — 74011250636 HC RX REV CODE- 250/636: Performed by: ADVANCED PRACTICE MIDWIFE

## 2022-03-06 PROCEDURE — 77030014125 HC TY EPDRL BBMI -B: Performed by: ANESTHESIOLOGY

## 2022-03-06 PROCEDURE — 76010000392 HC C SECN EA ADDL 0.5 HR: Performed by: OBSTETRICS & GYNECOLOGY

## 2022-03-06 PROCEDURE — 74011250636 HC RX REV CODE- 250/636: Performed by: ANESTHESIOLOGY

## 2022-03-06 PROCEDURE — 4A1HXCZ MONITORING OF PRODUCTS OF CONCEPTION, CARDIAC RATE, EXTERNAL APPROACH: ICD-10-PCS | Performed by: OBSTETRICS & GYNECOLOGY

## 2022-03-06 PROCEDURE — 74011000250 HC RX REV CODE- 250

## 2022-03-06 PROCEDURE — 76060000078 HC EPIDURAL ANESTHESIA: Performed by: OBSTETRICS & GYNECOLOGY

## 2022-03-06 PROCEDURE — 74011000250 HC RX REV CODE- 250: Performed by: ANESTHESIOLOGY

## 2022-03-06 PROCEDURE — 74011000250 HC RX REV CODE- 250: Performed by: ADVANCED PRACTICE MIDWIFE

## 2022-03-06 PROCEDURE — 99284 EMERGENCY DEPT VISIT MOD MDM: CPT

## 2022-03-06 PROCEDURE — 76010000391 HC C SECN FIRST 1 HR: Performed by: OBSTETRICS & GYNECOLOGY

## 2022-03-06 PROCEDURE — 75410000003 HC RECOV DEL/VAG/CSECN EA 0.5 HR: Performed by: OBSTETRICS & GYNECOLOGY

## 2022-03-06 PROCEDURE — 65270000029 HC RM PRIVATE

## 2022-03-06 PROCEDURE — 85025 COMPLETE CBC W/AUTO DIFF WBC: CPT

## 2022-03-06 PROCEDURE — 75410000002 HC LABOR FEE PER 1 HR

## 2022-03-06 RX ORDER — LIDOCAINE HYDROCHLORIDE 10 MG/ML
10 INJECTION INFILTRATION; PERINEURAL AS NEEDED
Status: DISCONTINUED | OUTPATIENT
Start: 2022-03-06 | End: 2022-03-07 | Stop reason: HOSPADM

## 2022-03-06 RX ORDER — EPHEDRINE SULFATE/0.9% NACL/PF 50 MG/5 ML
10 SYRINGE (ML) INTRAVENOUS
Status: COMPLETED | OUTPATIENT
Start: 2022-03-06 | End: 2022-03-06

## 2022-03-06 RX ORDER — OXYTOCIN/RINGER'S LACTATE 30/500 ML
10 PLASTIC BAG, INJECTION (ML) INTRAVENOUS AS NEEDED
Status: DISCONTINUED | OUTPATIENT
Start: 2022-03-06 | End: 2022-03-07

## 2022-03-06 RX ORDER — OXYTOCIN/RINGER'S LACTATE 30/500 ML
87.3 PLASTIC BAG, INJECTION (ML) INTRAVENOUS AS NEEDED
Status: DISCONTINUED | OUTPATIENT
Start: 2022-03-06 | End: 2022-03-07

## 2022-03-06 RX ORDER — SODIUM CHLORIDE, SODIUM LACTATE, POTASSIUM CHLORIDE, CALCIUM CHLORIDE 600; 310; 30; 20 MG/100ML; MG/100ML; MG/100ML; MG/100ML
INJECTION, SOLUTION INTRAVENOUS
Status: DISCONTINUED | OUTPATIENT
Start: 2022-03-06 | End: 2022-03-07 | Stop reason: HOSPADM

## 2022-03-06 RX ORDER — FENTANYL CITRATE 50 UG/ML
INJECTION, SOLUTION INTRAMUSCULAR; INTRAVENOUS AS NEEDED
Status: DISCONTINUED | OUTPATIENT
Start: 2022-03-06 | End: 2022-03-07 | Stop reason: HOSPADM

## 2022-03-06 RX ORDER — SODIUM CHLORIDE 0.9 % (FLUSH) 0.9 %
5-40 SYRINGE (ML) INJECTION EVERY 8 HOURS
Status: DISCONTINUED | OUTPATIENT
Start: 2022-03-06 | End: 2022-03-07

## 2022-03-06 RX ORDER — BUPIVACAINE HYDROCHLORIDE 2.5 MG/ML
INJECTION, SOLUTION EPIDURAL; INFILTRATION; INTRACAUDAL
Status: COMPLETED
Start: 2022-03-06 | End: 2022-03-06

## 2022-03-06 RX ORDER — EPHEDRINE SULFATE/0.9% NACL/PF 50 MG/5 ML
SYRINGE (ML) INTRAVENOUS
Status: COMPLETED
Start: 2022-03-06 | End: 2022-03-06

## 2022-03-06 RX ORDER — MAG HYDROX/ALUMINUM HYD/SIMETH 200-200-20
30 SUSPENSION, ORAL (FINAL DOSE FORM) ORAL
Status: DISCONTINUED | OUTPATIENT
Start: 2022-03-06 | End: 2022-03-07 | Stop reason: HOSPADM

## 2022-03-06 RX ORDER — FENTANYL CITRATE 50 UG/ML
100 INJECTION, SOLUTION INTRAMUSCULAR; INTRAVENOUS ONCE
Status: DISCONTINUED | OUTPATIENT
Start: 2022-03-06 | End: 2022-03-07 | Stop reason: HOSPADM

## 2022-03-06 RX ORDER — DEXAMETHASONE SODIUM PHOSPHATE 4 MG/ML
INJECTION, SOLUTION INTRA-ARTICULAR; INTRALESIONAL; INTRAMUSCULAR; INTRAVENOUS; SOFT TISSUE AS NEEDED
Status: DISCONTINUED | OUTPATIENT
Start: 2022-03-06 | End: 2022-03-07 | Stop reason: HOSPADM

## 2022-03-06 RX ORDER — NALOXONE HYDROCHLORIDE 0.4 MG/ML
0.4 INJECTION, SOLUTION INTRAMUSCULAR; INTRAVENOUS; SUBCUTANEOUS AS NEEDED
Status: DISCONTINUED | OUTPATIENT
Start: 2022-03-06 | End: 2022-03-07 | Stop reason: HOSPADM

## 2022-03-06 RX ORDER — FENTANYL/BUPIVACAINE/NS/PF 2-1250MCG
1-16 PREFILLED PUMP RESERVOIR EPIDURAL CONTINUOUS
Status: DISCONTINUED | OUTPATIENT
Start: 2022-03-06 | End: 2022-03-07 | Stop reason: HOSPADM

## 2022-03-06 RX ORDER — FENTANYL CITRATE 50 UG/ML
INJECTION, SOLUTION INTRAMUSCULAR; INTRAVENOUS
Status: COMPLETED
Start: 2022-03-06 | End: 2022-03-06

## 2022-03-06 RX ORDER — SODIUM CHLORIDE 0.9 % (FLUSH) 0.9 %
5-40 SYRINGE (ML) INJECTION AS NEEDED
Status: DISCONTINUED | OUTPATIENT
Start: 2022-03-06 | End: 2022-03-07

## 2022-03-06 RX ORDER — DIPHENHYDRAMINE HYDROCHLORIDE 50 MG/ML
12.5 INJECTION, SOLUTION INTRAMUSCULAR; INTRAVENOUS
Status: DISCONTINUED | OUTPATIENT
Start: 2022-03-06 | End: 2022-03-07 | Stop reason: HOSPADM

## 2022-03-06 RX ORDER — BUPIVACAINE HYDROCHLORIDE 2.5 MG/ML
INJECTION, SOLUTION EPIDURAL; INFILTRATION; INTRACAUDAL AS NEEDED
Status: DISCONTINUED | OUTPATIENT
Start: 2022-03-06 | End: 2022-03-07 | Stop reason: HOSPADM

## 2022-03-06 RX ORDER — SODIUM CHLORIDE, SODIUM LACTATE, POTASSIUM CHLORIDE, CALCIUM CHLORIDE 600; 310; 30; 20 MG/100ML; MG/100ML; MG/100ML; MG/100ML
125 INJECTION, SOLUTION INTRAVENOUS CONTINUOUS
Status: DISCONTINUED | OUTPATIENT
Start: 2022-03-06 | End: 2022-03-07

## 2022-03-06 RX ORDER — ONDANSETRON 2 MG/ML
4 INJECTION INTRAMUSCULAR; INTRAVENOUS ONCE
Status: DISCONTINUED | OUTPATIENT
Start: 2022-03-06 | End: 2022-03-07

## 2022-03-06 RX ORDER — LIDOCAINE HYDROCHLORIDE AND EPINEPHRINE 20; 5 MG/ML; UG/ML
INJECTION, SOLUTION EPIDURAL; INFILTRATION; INTRACAUDAL; PERINEURAL AS NEEDED
Status: DISCONTINUED | OUTPATIENT
Start: 2022-03-06 | End: 2022-03-07 | Stop reason: HOSPADM

## 2022-03-06 RX ORDER — ONDANSETRON 2 MG/ML
INJECTION INTRAMUSCULAR; INTRAVENOUS AS NEEDED
Status: DISCONTINUED | OUTPATIENT
Start: 2022-03-06 | End: 2022-03-07 | Stop reason: HOSPADM

## 2022-03-06 RX ADMIN — BUPIVACAINE HYDROCHLORIDE 10 ML: 2.5 INJECTION, SOLUTION EPIDURAL; INFILTRATION; INTRACAUDAL; PERINEURAL at 14:32

## 2022-03-06 RX ADMIN — Medication 10 MG: at 15:26

## 2022-03-06 RX ADMIN — LIDOCAINE HYDROCHLORIDE AND EPINEPHRINE 10 ML: 20; 5 INJECTION, SOLUTION EPIDURAL; INFILTRATION; INTRACAUDAL; PERINEURAL at 23:31

## 2022-03-06 RX ADMIN — WATER 2 G: 1 INJECTION INTRAMUSCULAR; INTRAVENOUS; SUBCUTANEOUS at 23:53

## 2022-03-06 RX ADMIN — FENTANYL CITRATE 100 MCG: 50 INJECTION, SOLUTION INTRAMUSCULAR; INTRAVENOUS at 14:32

## 2022-03-06 RX ADMIN — SODIUM CHLORIDE, POTASSIUM CHLORIDE, SODIUM LACTATE AND CALCIUM CHLORIDE 125 ML/HR: 600; 310; 30; 20 INJECTION, SOLUTION INTRAVENOUS at 14:03

## 2022-03-06 RX ADMIN — LIDOCAINE HYDROCHLORIDE AND EPINEPHRINE 5 ML: 20; 5 INJECTION, SOLUTION EPIDURAL; INFILTRATION; INTRACAUDAL; PERINEURAL at 23:49

## 2022-03-06 RX ADMIN — DEXAMETHASONE SODIUM PHOSPHATE 4 MG: 4 INJECTION, SOLUTION INTRAMUSCULAR; INTRAVENOUS at 23:55

## 2022-03-06 RX ADMIN — ONDANSETRON HYDROCHLORIDE 4 MG: 2 INJECTION, SOLUTION INTRAMUSCULAR; INTRAVENOUS at 23:55

## 2022-03-06 RX ADMIN — AZITHROMYCIN MONOHYDRATE 500 MG: 500 INJECTION, POWDER, LYOPHILIZED, FOR SOLUTION INTRAVENOUS at 23:53

## 2022-03-06 RX ADMIN — FENTANYL CITRATE 100 MCG: 50 INJECTION, SOLUTION INTRAMUSCULAR; INTRAVENOUS at 23:49

## 2022-03-06 RX ADMIN — SODIUM CHLORIDE, POTASSIUM CHLORIDE, SODIUM LACTATE AND CALCIUM CHLORIDE: 600; 310; 30; 20 INJECTION, SOLUTION INTRAVENOUS at 23:46

## 2022-03-06 RX ADMIN — Medication 10 ML/HR: at 14:52

## 2022-03-06 NOTE — ED PROVIDER NOTES
[facility] History and Physical    Patient is a 33 y.o.  at 40w0d with janine 3/6/22 who presents to the [facility] with c/o contractions and LOF at 1117. Thinks her water broke around 1030, clear. Has been having irregular contractions since Tuesday that increased in intensity after her water broke. Some nausea and vomiting. She reports good FM. Denies VB, fever, cough, sore throat, SOB/difficulty breathing, loss of taste/smell, exposure to anyone with COVID, h/a, changes in vision, RUQ/epigastric pain. She reports prenatal care with Dr. Jah Shearer c/b  Abnormal GCT- 3 hr WNL  Choroid plexus cyst- Mat 21 wnl, 28w growth 30%tile, CP cyst resolved          PNC: Blood type: O            RH: pos            Hep B: non reactive            Rubella: immune            GBS status: negative            HIV: non reactive            RPR/TPA/VDRL: negative            GC/CT: negative            HSV serology: not noted on prenatal records, but patient denies any hx of HSV           Past Medical History:   Diagnosis Date    Anemia     Gastroparesis 2017    Gastroparesis 2017    Ovarian cyst 2016       Past Surgical History:   Procedure Laterality Date    HX OTHER SURGICAL  2017    wisdom teeth         History reviewed. No pertinent family history.     Social History     Socioeconomic History    Marital status:      Spouse name: Not on file    Number of children: Not on file    Years of education: Not on file    Highest education level: Not on file   Occupational History    Not on file   Tobacco Use    Smoking status: Never Smoker    Smokeless tobacco: Never Used   Vaping Use    Vaping Use: Never used   Substance and Sexual Activity    Alcohol use: No     Alcohol/week: 0.0 standard drinks    Drug use: No    Sexual activity: Never     Comment: single no children   Other Topics Concern     Service No    Blood Transfusions No    Caffeine Concern No    Occupational Exposure No    Hobby Hazards No  Sleep Concern No    Stress Concern No    Weight Concern No    Special Diet No    Back Care No    Exercise No    Bike Helmet No    Seat Belt No    Self-Exams No   Social History Narrative    Not on file     Social Determinants of Health     Financial Resource Strain:     Difficulty of Paying Living Expenses: Not on file   Food Insecurity:     Worried About Running Out of Food in the Last Year: Not on file    Epi of Food in the Last Year: Not on file   Transportation Needs:     Lack of Transportation (Medical): Not on file    Lack of Transportation (Non-Medical): Not on file   Physical Activity:     Days of Exercise per Week: Not on file    Minutes of Exercise per Session: Not on file   Stress:     Feeling of Stress : Not on file   Social Connections:     Frequency of Communication with Friends and Family: Not on file    Frequency of Social Gatherings with Friends and Family: Not on file    Attends Buddhist Services: Not on file    Active Member of [address] [place] [address] or Organizations: Not on file    Attends Club or Organization Meetings: Not on file    Marital Status: Not on file   Intimate Partner Violence:     Fear of Current or Ex-Partner: Not on file    Emotionally Abused: Not on file    Physically Abused: Not on file    Sexually Abused: Not on file   Housing Stability:     Unable to Pay for Housing in the Last Year: Not on file    Number of [place] in the Last Year: Not on file    Unstable Housing in the Last Year: Not on file   Denies tobacco, alcohol, illicits  Denies hx STIs      ALLERGIES: Latex    Review of Systems   Constitutional: Negative for chills and fever. Eyes: Negative for visual disturbance. Respiratory: Negative for cough and shortness of breath. Cardiovascular: Negative for chest pain and leg swelling. Gastrointestinal: Positive for abdominal pain, nausea and vomiting. Negative for diarrhea.         Contractions   Genitourinary: Positive for vaginal discharge. Negative for vaginal bleeding. Leaking fluid   Musculoskeletal: Negative for gait problem. Neurological: Negative for speech difficulty and headaches. All other systems reviewed and are negative. Vitals:    03/06/22 1126   BP: 122/77   Pulse: 83   Resp: 17   Temp: 98.1 °F (36.7 °C)            Physical Exam  Vitals and nursing note reviewed. Exam conducted with a chaperone present. Constitutional:       General: She is awake. Appearance: Normal appearance. She is well-developed, well-groomed and normal weight. Comments: Appears uncomfortable with contractions, but short in duration still   HENT:      Head: Normocephalic. Nose: Nose normal.   Cardiovascular:      Rate and Rhythm: Normal rate and regular rhythm. Pulses: Normal pulses. Pulmonary:      Effort: Pulmonary effort is normal. No respiratory distress. Comments: Breathing easy and unlabored between contractions  Abdominal:      Tenderness: There is no abdominal tenderness. Comments: Gravid c/w 40w  FHTs: 120s, +accels, neg decels, moderate variability  Minor: q2-3 minutes, mild to moderate   Genitourinary:     General: Normal vulva. Exam position: Supine. Labia:         Right: No lesion. Left: No lesion. Vagina: Normal.      Cervix: Dilated. Uterus: Normal. Enlarged. Rectum: Normal.      Comments: SVE 3/90/-2, vtx  +nitrazine, clear fluid  Pelvis feels adequate  No lesions noted    Musculoskeletal:         General: Normal range of motion. Cervical back: Normal range of motion. Right lower leg: No edema. Left lower leg: No edema. Skin:     General: Skin is warm and dry. Neurological:      Mental Status: She is alert and oriented to person, place, and time. Gait: Gait is intact. Psychiatric:         Mood and Affect: Mood normal.         Speech: Speech normal.         Behavior: Behavior normal. Behavior is cooperative.          Thought Content: Thought content normal.         Cognition and Memory: Cognition and memory normal.         Judgment: Judgment normal.          MDM  Number of Diagnoses or Management Options  40 weeks gestation of pregnancy: established and improving  Abdominal pain during pregnancy, third trimester: new and requires workup  Amniotic fluid leaking: new and requires workup     Amount and/or Complexity of Data Reviewed  Clinical lab tests: ordered and reviewed  Tests in the medicine section of CPT®: ordered and reviewed  Review and summarize past medical records: yes  Independent visualization of images, tracings, or specimens: yes    Risk of Complications, Morbidity, and/or Mortality  Presenting problems: moderate  Diagnostic procedures: moderate  Management options: moderate    Critical Care  Total time providing critical care: < 30 minutes    Patient Progress  Patient progress: stable    ED Course as of 22 1209   Sun Mar 06, 2022   1136 Here with c/o LOF around 1030 and contractions. +Nitrazine. SVE 3/90/-2, vtx.  Admit to L&D [SB]      ED Course User Index  [SB] Omi Gwyn, CNM       Procedures  NST, SVE    Impression:  at 40w0d IUP  Latent labor, SROM  Cat 1 tracing  GBS negative      Plan:  Admit to [facility] for eval  Reviewed +nitrazine and SVE with patient/partner  Admit to L&D  Review and sign consents  CBC, T&S  Monitoring per protocol  Expectant management for now- patient would like to try without epidural  Recheck with maternal/fetal indication- try to limit exams d/t ROM and infection risk  Reviewed prenatal records    Reviewed plan with patient/partner, all questions asked/answered and they agree with plan

## 2022-03-06 NOTE — PROGRESS NOTES
Labor Progress Note    S: Called to check patient as difficult to tell if early/late decels and patient with low baseline. Patient seen, fetal heart rate and contraction pattern evaluated. Feeling some pressure.       Physical Exam:  Patient Vitals for the past 4 hrs:   Temp Pulse Resp BP SpO2   22 1556 97.8 °F (36.6 °C) 87 16 110/64 95 %   22 1533  85  (!) 140/74 97 %   22 1531     97 %   22 1430 97.6 °F (36.4 °C) 70 16 107/63 97 %   22 1428  67  (!) 107/56    22 1427  75  (!) 116/56 92 %   22 1426     (!) 66 %   22 1422  81  120/76 91 %   22 1421     96 %   22 1420  76  130/86          Cervical Exam: 8-9/100/0  Membranes:  clear  Uterine Contractions:  Frequency: q2-3 minutes, strong  Fetal Heart Rate: 105s, +accels, early decels, moderate variability      Assessment/Plan:    33 y.o.  at 40w0d IUP  Active labor, SROM  Cat 2 tracing  GBS negative    P:  Continue present management  Reviewed strip with Dr. David Piedra- will continue to monitor    Jd Atkins CNM

## 2022-03-06 NOTE — H&P
Labor and Delivery History and Physical  3/6/2022    Patient is a 33 y.o. Tiffany Mcwilliams at 40w0d with janine 3/6/22 who now is admitted to L&D for latent labor and SROM. She initially presents to the [facility] with c/o contractions and LOF at 1117. Thinks her water broke around 1030, clear. Has been having irregular contractions since Tuesday that increased in intensity after her water broke. Some nausea and vomiting. She reports good FM. Denies VB, fever, cough, sore throat, SOB/difficulty breathing, loss of taste/smell, exposure to anyone with COVID, h/a, changes in vision, RUQ/epigastric pain.       She reports prenatal care with Dr. Daniel Ferris c/b  Abnormal GCT- 3 hr WNL  Choroid plexus cyst- Mat 21 wnl, 28w growth 30%tile, CP cyst resolved           PNC: Blood type: O            RH: pos            Hep B: non reactive            Rubella: immune            GBS status: negative            HIV: non reactive            RPR/TPA/VDRL: negative            GC/CT: negative            HSV serology: not noted on prenatal records, but patient denies any hx of HSV    OBHX:   OB History        1    Para        Term                AB        Living           SAB        IAB        Ectopic        Molar        Multiple   1    Live Births                    PMH:   Past Medical History:   Diagnosis Date    Anemia     Gastroparesis 2017    Gastroparesis 2017    Ovarian cyst 2016       PSH:   Past Surgical History:   Procedure Laterality Date    HX OTHER SURGICAL  2017    wisdom teeth       OB/GYN:  at 40w0d with janine 3/6/22. See above    Meds:   Prior to Admission Medications   Prescriptions Last Dose Informant Patient Reported? Taking? PNV JI.13/TTSZMTD fum/folic ac (PRENATAL PO)   Yes No   Sig: Take 1 Tablet by mouth. Facility-Administered Medications: None       Allergies:    Allergies   Allergen Reactions    Latex Itching       Pertinent ROS: Review of Systems - Negative except noted in HPI  Constitutional: Negative for chills and fever. Eyes: Negative for visual disturbance. Respiratory: Negative for cough and shortness of breath. Cardiovascular: Negative for chest pain and leg swelling. Gastrointestinal: Positive for abdominal pain, nausea and vomiting. Negative for diarrhea. Contractions   Genitourinary: Positive for vaginal discharge. Negative for vaginal bleeding. Leaking fluid   Musculoskeletal: Negative for gait problem. Neurological: Negative for speech difficulty and headaches. All other systems reviewed and are negative. [address]: History reviewed. No pertinent family history. SH:   Social History     Socioeconomic History    Marital status:      Spouse name: Not on file    Number of children: Not on file    Years of education: Not on file    Highest education level: Not on file   Occupational History    Not on file   Tobacco Use    Smoking status: Never Smoker    Smokeless tobacco: Never Used   Vaping Use    Vaping Use: Never used   Substance and Sexual Activity    Alcohol use: No     Alcohol/week: 0.0 standard drinks    Drug use: No    Sexual activity: Never     Comment: single no children   Other Topics Concern     Service No    Blood Transfusions No    Caffeine Concern No    Occupational Exposure No    Hobby Hazards No    Sleep Concern No    Stress Concern No    Weight Concern No    Special Diet No    Back Care No    Exercise No    Bike Helmet No    Seat Belt No    Self-Exams No   Social History Narrative    Not on file     Social Determinants of Health     Financial Resource Strain:     Difficulty of Paying Living Expenses: Not on file   Food Insecurity:     Worried About Running Out of Food in the Last Year: Not on file    Epi of Food in the Last Year: Not on file   Transportation Needs:     Lack of Transportation (Medical): Not on file    Lack of Transportation (Non-Medical):  Not on file   Physical Activity:     Days of Exercise per Week: Not on file    Minutes of Exercise per Session: Not on file   Stress:     Feeling of Stress : Not on file   Social Connections:     Frequency of Communication with Friends and Family: Not on file    Frequency of Social Gatherings with Friends and Family: Not on file    Attends Anabaptism Services: Not on file    Active Member of [address] [place] [address] or Organizations: Not on file    Attends Club or Organization Meetings: Not on file    Marital Status: Not on file   Intimate Partner Violence:     Fear of Current or Ex-Partner: Not on file    Emotionally Abused: Not on file    Physically Abused: Not on file    Sexually Abused: Not on file   Housing Stability:     Unable to Pay for Housing in the Last Year: Not on file    Number of [place] in the Last Year: Not on file    Unstable Housing in the Last Year: Not on file   Denies tobacco, alcohol, illicits  Denies hx STIs    OBJECTIVE:    Temp (24hrs), Av.1 °F (36.7 °C), Min:98.1 °F (36.7 °C), Max:98.1 °F (36.7 °C)    Visit Vitals  /77   Pulse 83   Temp 98.1 °F (36.7 °C)   Resp 17   Breastfeeding Unknown       Physical Exam  Vitals and nursing note reviewed. Exam conducted with a chaperone present. Constitutional:       General: She is awake. Appearance: Normal appearance. She is well-developed, well-groomed and normal weight. Comments: Appears uncomfortable with contractions, but short in duration still   HENT:      Head: Normocephalic. Nose: Nose normal.   Cardiovascular:      Rate and Rhythm: Normal rate and regular rhythm. Pulses: Normal pulses. Pulmonary:      Effort: Pulmonary effort is normal. No respiratory distress. Comments: Breathing easy and unlabored between contractions  Abdominal:      Tenderness: There is no abdominal tenderness.       Comments: Gravid c/w 40w  FHTs: 120s, +accels, neg decels, moderate variability  [place]: q2-3 minutes, mild to moderate   Genitourinary: General: Normal vulva. Exam position: Supine. Labia:         Right: No lesion. Left: No lesion. Vagina: Normal.      Cervix: Dilated. Uterus: Normal. Enlarged. Rectum: Normal.      Comments: /-2, vtx  +nitrazine, clear fluid  Pelvis feels adequate  No lesions noted    Musculoskeletal:         General: Normal range of motion. Cervical back: Normal range of motion. Right lower leg: No edema. Left lower leg: No edema. Skin:     General: Skin is warm and dry. Neurological:      Mental Status: She is alert and oriented to person, place, and time. Gait: Gait is intact. Psychiatric:         Mood and Affect: Mood normal.         Speech: Speech normal.         Behavior: Behavior normal. Behavior is cooperative. Thought Content:  Thought content normal.         Cognition and Memory: Cognition and memory normal.         Judgment: Judgment normal.     Impression:  at 40w0d IUP  Latent labor, SROM  Cat 1 tracing  GBS negative        Plan:  Admit to L&D  Review and sign consents  CBC, T&S  Monitoring per protocol  Expectant management for now- patient would like to try without epidural  Recheck with maternal/fetal indication- try to limit exams d/t ROM and infection risk  Reviewed prenatal records    Reviewed plan with patient/partner, all questions asked/answered and they agree with plan    Chas Sidhu CNM  12:59 PM

## 2022-03-06 NOTE — PROGRESS NOTES
1535. Bedside and Verbal shift change report given to alejandro Lal rn (oncoming nurse) by oliver Sherwood rn (offgoing nurse). Report included the following information SBAR, Intake/Output, MAR and Recent Results. 1740. C/c/+1 sve by jose raul lal rn    1743. Pt feeling pressure urge to push. Begin pushing. [id number]. RN at bedside, continuously monitoring FHR tracing. [phone]. RN at bedside, continuously monitoring FHR tracing. 1815. Pt having difficulty with consistently pushing effectively. squinting face and blowing out air during pushing. Little movement felt during pushes. 182. Darryle Ginger, cnm at bedside to assess progress. Plan of care discussed. Pt will labor down for 30 minutes for ineffective pushing. Will reevaluate at [address] [facility]. Resume pushing. [phone]. RN at bedside, continuously monitoring FHR tracing    [phone]. RN at bedside, continuously monitoring FHR tracing    . KOBE De La O at bedside to assess labor progress. [phone]. RN at bedside, continuously monitoring FHR tracing    [id number]. RN at bedside, continuously monitoring FHR tracing    [phone]. RN at bedside, continuously monitoring FHR tracing            2130. Dr. Chaim Javier and kobe De La O at bedside to discuss progrress. Pt can push longer and will recheck for progress in an hour. c section and vacuum possibilities at this time if significant progress is not made. [id number] RN at bedside, continuously monitoring FHR tracing    2245. JODI Barboza rn at bedside for second set of eyes. Pt not progressing.    2250. Darryle Ginger, cnm at bedside, pt not making progress. Pt wants to push until 2300 and then take a break.    2300. Pt requests to stop pushing at this time. 2310. Dr. Chaim Javier at bedside discussing risks and benefits for . Plan of care established. Questions answered. Pt moving forward with c- section for delivery. 2325.  Bedside and Verbal shift change report given to jorge Grissom rn (oncoming nurse) by alejandro Baird rn (offgoing nurse). Report included the following information SBAR, Intake/Output, MAR and Recent Results.

## 2022-03-06 NOTE — TELEPHONE ENCOUNTER
Subjective: Caller states \"I am calling because my wife's water just broke. We are in the ER now. \"     Stated did call OB prior to going to ED. Recommended disposition: Go to ED Now follow advice per OB. Pt is currently in ED at time of call. Care advice provided, patient verbalizes understanding; denies any other questions or concerns; instructed to call back for any new or worsening symptoms. Attention Provider: Thank you for allowing me to participate in the care of your patient. The patient was connected to triage in response to symptoms provided. Please do not respond through this encounter as the response is not directed to a shared pool.       Reason for Disposition   Caller has already spoken with the PCP (or office), and has no further questions    Protocols used: NO CONTACT OR DUPLICATE CONTACT CALL-ADULT-[place]

## 2022-03-06 NOTE — ANESTHESIA PROCEDURE NOTES
Epidural Block    Patient location during procedure: OB  Start time: 3/6/2022 2:41 PM  Reason for block: labor epidural  Staffing  Performed: attending   Anesthesiologist: Vicky Duarte MD  Preanesthetic Checklist  Completed: patient identified, IV checked, site marked, risks and benefits discussed, surgical consent, monitors and equipment checked, pre-op evaluation and timeout performed  Block Placement  Patient position: sitting  Prep: DuraPrep  Sterility prep: cap, drape, gloves and mask  Sedation level: no sedation  Patient monitoring: continuous pulse oximetry and heart rate  Approach: midline  Location: lumbar  Lumbar location: L4-L5  Epidural  Loss of resistance technique: saline  Guidance: landmark technique  Needle  Needle type: Tuohy   Needle gauge: 17 G  Needle length: 9 cm  Needle insertion depth: 5 cm  Catheter type: end hole  Catheter size: 19 G  Catheter at skin depth: 10 cm  Catheter securement method: clear occlusive dressing, surgical tape and liquid medical adhesive  Assessment  Sensory level: T6  Block outcome: pain improved  Number of attempts: 1  Procedure assessment: patient tolerated procedure well with no immediate complications

## 2022-03-06 NOTE — PROGRESS NOTES
1117: patient arrived from home with c/o leaking fluid since 1045. Since ROM, reports strong contractions. Denies any vaginal bleeding, reports active FM.   1127: Jacky Aranda at bedside, SROM confirmed, discussed plan for admission   1150: ambulated to L&D 2 for admission  1211: Bedside shift change report given to Jayashree Gracia RN (oncoming nurse) by KANDI Mcgrath RN (offgoing nurse). Report included the following information SBAR.

## 2022-03-06 NOTE — PROGRESS NOTES
1215 Bedside shift change report given to Salma Phillips RN (oncoming nurse) by Rafiq Hamm RN (offgoing nurse). Report included the following information SBAR, MAR and Recent Results. [address] Suman Staples at bedside for epidural placement. Patient tolerated well. 1530 Bedside shift change report given to Mata Stark RN (oncoming nurse) by Salma Phillips RN (offgoing nurse). Report included the following information SBAR, Procedure Summary, Intake/Output, MAR and Recent Results.

## 2022-03-07 LAB
ERYTHROCYTE [DISTWIDTH] IN BLOOD BY AUTOMATED COUNT: 13.2 % (ref 11.5–14.5)
HCT VFR BLD AUTO: 30.1 % (ref 35–47)
HGB BLD-MCNC: 10 G/DL (ref 11.5–16)
MCH RBC QN AUTO: 29.9 PG (ref 26–34)
MCHC RBC AUTO-ENTMCNC: 33.2 G/DL (ref 30–36.5)
MCV RBC AUTO: 90.1 FL (ref 80–99)
NRBC # BLD: 0 K/UL (ref 0–0.01)
NRBC BLD-RTO: 0 PER 100 WBC
PLATELET # BLD AUTO: 176 K/UL (ref 150–400)
PMV BLD AUTO: 10.8 FL (ref 8.9–12.9)
RBC # BLD AUTO: 3.34 M/UL (ref 3.8–5.2)
WBC # BLD AUTO: 16.7 K/UL (ref 3.6–11)

## 2022-03-07 PROCEDURE — 36415 COLL VENOUS BLD VENIPUNCTURE: CPT

## 2022-03-07 PROCEDURE — 74011250637 HC RX REV CODE- 250/637: Performed by: OBSTETRICS & GYNECOLOGY

## 2022-03-07 PROCEDURE — 85027 COMPLETE CBC AUTOMATED: CPT

## 2022-03-07 PROCEDURE — 74011250636 HC RX REV CODE- 250/636: Performed by: OBSTETRICS & GYNECOLOGY

## 2022-03-07 PROCEDURE — 74011000250 HC RX REV CODE- 250: Performed by: ANESTHESIOLOGY

## 2022-03-07 PROCEDURE — 65410000002 HC RM PRIVATE OB

## 2022-03-07 PROCEDURE — 74011250636 HC RX REV CODE- 250/636: Performed by: ANESTHESIOLOGY

## 2022-03-07 RX ORDER — MORPHINE SULFATE 10 MG/ML
10 INJECTION, SOLUTION INTRAMUSCULAR; INTRAVENOUS
Status: DISCONTINUED | OUTPATIENT
Start: 2022-03-07 | End: 2022-03-09 | Stop reason: HOSPADM

## 2022-03-07 RX ORDER — MORPHINE SULFATE 10 MG/ML
6 INJECTION, SOLUTION INTRAMUSCULAR; INTRAVENOUS
Status: DISCONTINUED | OUTPATIENT
Start: 2022-03-07 | End: 2022-03-09 | Stop reason: HOSPADM

## 2022-03-07 RX ORDER — OXYTOCIN/RINGER'S LACTATE 30/500 ML
87.3 PLASTIC BAG, INJECTION (ML) INTRAVENOUS AS NEEDED
Status: DISCONTINUED | OUTPATIENT
Start: 2022-03-07 | End: 2022-03-09 | Stop reason: HOSPADM

## 2022-03-07 RX ORDER — PHENYLEPHRINE HCL IN 0.9% NACL 0.4MG/10ML
SYRINGE (ML) INTRAVENOUS AS NEEDED
Status: DISCONTINUED | OUTPATIENT
Start: 2022-03-07 | End: 2022-03-07 | Stop reason: HOSPADM

## 2022-03-07 RX ORDER — IBUPROFEN 400 MG/1
800 TABLET ORAL EVERY 8 HOURS
Status: DISCONTINUED | OUTPATIENT
Start: 2022-03-07 | End: 2022-03-09 | Stop reason: HOSPADM

## 2022-03-07 RX ORDER — PROCHLORPERAZINE EDISYLATE 5 MG/ML
5 INJECTION INTRAMUSCULAR; INTRAVENOUS
Status: DISCONTINUED | OUTPATIENT
Start: 2022-03-07 | End: 2022-03-07 | Stop reason: SDUPTHER

## 2022-03-07 RX ORDER — OXYCODONE AND ACETAMINOPHEN 5; 325 MG/1; MG/1
1 TABLET ORAL
Status: DISCONTINUED | OUTPATIENT
Start: 2022-03-07 | End: 2022-03-09 | Stop reason: HOSPADM

## 2022-03-07 RX ORDER — KETOROLAC TROMETHAMINE 30 MG/ML
30 INJECTION, SOLUTION INTRAMUSCULAR; INTRAVENOUS
Status: ACTIVE | OUTPATIENT
Start: 2022-03-07 | End: 2022-03-08

## 2022-03-07 RX ORDER — ONDANSETRON 4 MG/1
4 TABLET, ORALLY DISINTEGRATING ORAL
Status: DISCONTINUED | OUTPATIENT
Start: 2022-03-07 | End: 2022-03-09 | Stop reason: HOSPADM

## 2022-03-07 RX ORDER — SIMETHICONE 80 MG
80 TABLET,CHEWABLE ORAL
Status: DISCONTINUED | OUTPATIENT
Start: 2022-03-07 | End: 2022-03-09 | Stop reason: HOSPADM

## 2022-03-07 RX ORDER — DOCUSATE SODIUM 100 MG/1
100 CAPSULE, LIQUID FILLED ORAL
Status: DISCONTINUED | OUTPATIENT
Start: 2022-03-07 | End: 2022-03-09 | Stop reason: HOSPADM

## 2022-03-07 RX ORDER — HYDROCORTISONE 1 %
CREAM (GRAM) TOPICAL AS NEEDED
Status: DISCONTINUED | OUTPATIENT
Start: 2022-03-07 | End: 2022-03-09 | Stop reason: HOSPADM

## 2022-03-07 RX ORDER — SODIUM CHLORIDE 0.9 % (FLUSH) 0.9 %
5-40 SYRINGE (ML) INJECTION EVERY 8 HOURS
Status: DISCONTINUED | OUTPATIENT
Start: 2022-03-07 | End: 2022-03-09 | Stop reason: HOSPADM

## 2022-03-07 RX ORDER — NALOXONE HYDROCHLORIDE 0.4 MG/ML
0.4 INJECTION, SOLUTION INTRAMUSCULAR; INTRAVENOUS; SUBCUTANEOUS AS NEEDED
Status: DISCONTINUED | OUTPATIENT
Start: 2022-03-07 | End: 2022-03-09 | Stop reason: HOSPADM

## 2022-03-07 RX ORDER — OXYTOCIN/RINGER'S LACTATE 30/500 ML
PLASTIC BAG, INJECTION (ML) INTRAVENOUS
Status: DISCONTINUED | OUTPATIENT
Start: 2022-03-07 | End: 2022-03-07 | Stop reason: HOSPADM

## 2022-03-07 RX ORDER — SODIUM CHLORIDE 0.9 % (FLUSH) 0.9 %
5-40 SYRINGE (ML) INJECTION AS NEEDED
Status: DISCONTINUED | OUTPATIENT
Start: 2022-03-07 | End: 2022-03-09 | Stop reason: HOSPADM

## 2022-03-07 RX ORDER — DIPHENHYDRAMINE HYDROCHLORIDE 50 MG/ML
12.5 INJECTION, SOLUTION INTRAMUSCULAR; INTRAVENOUS
Status: DISCONTINUED | OUTPATIENT
Start: 2022-03-07 | End: 2022-03-09 | Stop reason: HOSPADM

## 2022-03-07 RX ORDER — DIPHENHYDRAMINE HCL 25 MG
25 CAPSULE ORAL
Status: DISCONTINUED | OUTPATIENT
Start: 2022-03-07 | End: 2022-03-09 | Stop reason: HOSPADM

## 2022-03-07 RX ORDER — AMMONIA 15 % (W/V)
1 AMPUL (EA) INHALATION AS NEEDED
Status: DISCONTINUED | OUTPATIENT
Start: 2022-03-07 | End: 2022-03-09 | Stop reason: HOSPADM

## 2022-03-07 RX ORDER — ONDANSETRON 2 MG/ML
4 INJECTION INTRAMUSCULAR; INTRAVENOUS
Status: DISCONTINUED | OUTPATIENT
Start: 2022-03-07 | End: 2022-03-09 | Stop reason: HOSPADM

## 2022-03-07 RX ORDER — SODIUM CHLORIDE, SODIUM LACTATE, POTASSIUM CHLORIDE, CALCIUM CHLORIDE 600; 310; 30; 20 MG/100ML; MG/100ML; MG/100ML; MG/100ML
125 INJECTION, SOLUTION INTRAVENOUS CONTINUOUS
Status: DISCONTINUED | OUTPATIENT
Start: 2022-03-07 | End: 2022-03-09 | Stop reason: HOSPADM

## 2022-03-07 RX ORDER — ACETAMINOPHEN 325 MG/1
650 TABLET ORAL
Status: DISCONTINUED | OUTPATIENT
Start: 2022-03-07 | End: 2022-03-09 | Stop reason: HOSPADM

## 2022-03-07 RX ORDER — MORPHINE SULFATE 0.5 MG/ML
INJECTION, SOLUTION EPIDURAL; INTRATHECAL; INTRAVENOUS AS NEEDED
Status: DISCONTINUED | OUTPATIENT
Start: 2022-03-07 | End: 2022-03-07 | Stop reason: HOSPADM

## 2022-03-07 RX ORDER — KETOROLAC TROMETHAMINE 30 MG/ML
INJECTION, SOLUTION INTRAMUSCULAR; INTRAVENOUS AS NEEDED
Status: DISCONTINUED | OUTPATIENT
Start: 2022-03-07 | End: 2022-03-07 | Stop reason: HOSPADM

## 2022-03-07 RX ORDER — OXYTOCIN/RINGER'S LACTATE 30/500 ML
10 PLASTIC BAG, INJECTION (ML) INTRAVENOUS AS NEEDED
Status: DISCONTINUED | OUTPATIENT
Start: 2022-03-07 | End: 2022-03-09 | Stop reason: HOSPADM

## 2022-03-07 RX ADMIN — Medication 80 MCG: at 00:37

## 2022-03-07 RX ADMIN — Medication 909 ML/HR: at 00:11

## 2022-03-07 RX ADMIN — SODIUM CHLORIDE, POTASSIUM CHLORIDE, SODIUM LACTATE AND CALCIUM CHLORIDE 125 ML/HR: 600; 310; 30; 20 INJECTION, SOLUTION INTRAVENOUS at 07:54

## 2022-03-07 RX ADMIN — KETOROLAC TROMETHAMINE 30 MG: 30 INJECTION, SOLUTION INTRAMUSCULAR; INTRAVENOUS at 00:24

## 2022-03-07 RX ADMIN — SODIUM CHLORIDE, PRESERVATIVE FREE 5 MG: 5 INJECTION INTRAVENOUS at 08:19

## 2022-03-07 RX ADMIN — Medication 80 MCG: at 00:19

## 2022-03-07 RX ADMIN — SIMETHICONE 80 MG: 80 TABLET, CHEWABLE ORAL at 18:56

## 2022-03-07 RX ADMIN — Medication 3 MG: at 00:13

## 2022-03-07 RX ADMIN — SODIUM CHLORIDE, POTASSIUM CHLORIDE, SODIUM LACTATE AND CALCIUM CHLORIDE: 600; 310; 30; 20 INJECTION, SOLUTION INTRAVENOUS at 00:25

## 2022-03-07 NOTE — PROGRESS NOTES
Labor Progress Note  Summary note from when I saw patient and 930P and now. .  Patient Vitals for the past 1 hrs:   BP Temp Pulse   22 2218 117/65 98.8 °F (37.1 °C) 71       Physical Exam:  Cervical Exam:  10/100/+1 with caput when I checked at 930pm  Uterine Activity: q4-5 min  Fetal Heart Rate: Baseline: 120 per minute  Variability: moderate  Accelerations: yes  Decelerations: variable    Assessment/Plan:  Patient rechecked by July Gaona and cervix unchanged with more caput. She has been complete for almost 6 hours and pushing for over 3 with no change. Recommend  for second stage arrest. Patient and partner agree. Anesthesia and nursing notified.

## 2022-03-07 NOTE — ROUTINE PROCESS
18 Received OB SBAR Report from [address] Patient in [address] [place] [address] for  delivery    0010 Delivery of liveborn male infant

## 2022-03-07 NOTE — L&D DELIVERY NOTE
Delivery Summary    Patient: Henny Wyatt MRN: [id number]  SSN: [id number]    YOB: 1995  Age: 33 y.o. Sex: female        Information for the patient's :  Mic Chapman [[id number]]       Labor Events:    Labor: No    Steroids: None   Cervical Ripening Date/Time:       Cervical Ripening Type: None   Antibiotics During Labor: No   Rupture Identifier:      Rupture Date/Time: 3/6/2022 10:45 AM   Rupture Type: SROM   Amniotic Fluid Volume: Moderate    Amniotic Fluid Description: Clear    Amniotic Fluid Odor: None    Induction: None       Induction Date/Time:        Indications for Induction:      Augmentation:     Augmentation Date/Time:      Indications for Augmentation:     Labor complications: Additional complications:        Delivery Events:  Indications For Episiotomy:     Episiotomy: None   Perineal Laceration(s): None   Repaired:     Periurethral Laceration Location:      Repaired:     Labial Laceration Location:     Repaired:     Sulcal Laceration Location:     Repaired:     Vaginal Laceration Location:     Repaired:     Cervical Laceration Location:     Repaired:     Repair Suture: None   Number of Repair Packets:     Estimated Blood Loss (ml):  ml   Quantitaive Blood Loss (ml):             Delivery Date: 3/7/2022    Delivery Time: 12:10 AM   Delivery Type: , Low Transverse     Details    Trial of Labor: No   Primary/Repeat: Primary   Priority: Routine   Indications:  Arrest of Descent; Failure to Progress       Sex:  Male     Gestational Age: 44w3d  Delivery Clinician:  Lieutenant Praveen Eaton  Living Status: Living   Delivery Location: OR            APGARS  One minute Five minutes Ten minutes   Skin color: 1   1        Heart rate: 2   2        Grimace: 2   2        Muscle tone: 2   2        Breathin   2        Totals: 9   9          Presentation: Vertex    Position:        Resuscitation Method:  Suctioning-bulb     Meconium Stained: None Cord Information: 2 Vessels  Complications: None  Cord around:    Delayed cord clamping? Yes  Cord clamped date/time:3/7/2022 12:12 AM  Disposition of Cord Blood: Lab    Blood Gases Sent?: No    Placenta:  Date/Time: 3/7/2022 12:13 AM  Removal: Manual Removal      Appearance: Normal      Measurements:  Birth Weight:        Birth Length:        Head Circumference:        Chest Circumference:       Abdominal Girth: Other Providers:   Amelia CHAU;GHASSAN DODSON;YARELY BRODY;POPPY GUERRA, Obstetrician;Primary Nurse;Primary [place] Nurse;Midwife             Group B Strep:   Lab Results   Component Value Date/Time    GrBStrep, External negative 2022 12:00 AM     Information for the patient's :  Linwood Castro [[id number]]   No results found for: ABORH, PCTABR, PCTDIG, BILI, ABORHEXT, ABORH     No results for input(s): PCO2CB, PO2CB, HCO3I, SO2I, IBD, PTEMPI, SPECTI, PHICB, ISITE, IDEV, IALLEN in the last 72 hours.

## 2022-03-07 NOTE — PROGRESS NOTES
Labor Progress Note    S: Patient seen, fetal heart rate and contraction pattern evaluated. Patient resumed pushing at 80 and has been pushing with RN and partner at bedside.       Physical Exam:  Patient Vitals for the past 4 hrs:   Temp Pulse Resp BP   22 2218 98.8 °F (37.1 °C) 71  117/65   22 2048 98.6 °F (37 °C) 79  120/71   22 1941 98.5 °F (36.9 °C) 84 16 107/64         Cervical Exam: c/c/1  Membranes: clear  Uterine Contractions:  Frequency: q2-4 minutes, strong  Fetal Heart Rate: 125s, +accels, early and variable decels, moderate variability      Assessment/Plan:    33 y.o.  at 40w0d IUP  2nd stage labor  Cat 2 tracing  GBS negative    P:  C/w Dr. Pao Sen and she was in to evaluate patient/progress and push with patient- plan to continue pushing for about another hour and then reassess   All questions asked/answered and patient/partner agree with plan    Jennifer Oliver CNM

## 2022-03-07 NOTE — PROGRESS NOTES
In room pushing with patient, very little progress since last check, c/c/1 with more caput. Reviewed exam with patient/partner and advised likely will need to proceed with c/s. Dr. Lefty Simmons notified and will be out to speak with them shortly. All questions addressed.

## 2022-03-07 NOTE — PROGRESS NOTES
Post-Operative  Day 0    Diana Pal     Assessment: Post-Op day 0, stable  Initially with severe nausea - on zofran/compazine, now states it has subsided  Dizziness - normal vitals, will draw CBC    Plan:     - Routine post-operative care. - declines  circumcision  - Postop hemoglobin stable. Plan to start Fe at discharge if pt anemic.  - Ambulate today. Information for the patient's :  Kirill Castañeda [[id number]]   , Low Transverse     Patient doing well without significant complaint. Tolerating clears. Gomez out. Vitals:  Visit Vitals  /78 (BP 1 Location: Right upper arm, BP Patient Position: At rest)   Pulse 73   Temp 97.5 °F (36.4 °C)   Resp 16   SpO2 98%   Breastfeeding Unknown     Temp (24hrs), Av °F (36.7 °C), Min:96.8 °F (36 °C), Max:98.8 °F (37.1 °C)      Last 24hr Input/Output:    Intake/Output Summary (Last 24 hours) at 3/7/2022 1124  Last data filed at 3/7/2022 1009  Gross per 24 hour   Intake 1370 ml   Output 6835 ml   Net -5465 ml          Exam:     Patient without distress. Fundus firm, nontender per nursing fundal checks. Incision bandaged, clean, dry, intact. Perineum with normal lochia noted per nursing assessment. Lower extremities are negative for pathological edema.     Labs:   Lab Results   Component Value Date/Time    WBC 16.7 (H) 2022 10:54 AM    WBC 8.7 2022 12:11 PM    WBC 10.4 2021 07:35 PM    WBC 5.7 2020 10:55 AM    WBC 5.3 03/15/2019 08:31 AM    WBC 5.8 2018 05:41 AM    WBC 6.5 2017 12:51 PM    WBC 5.1 2017 01:27 PM    WBC 5.3 2015 09:45 AM    HGB 10.0 (L) 2022 10:54 AM    HGB 11.1 (L) 2022 12:11 PM    HGB 10.7 (L) 2021 07:35 PM    HGB 14.1 2020 10:55 AM    HGB 12.2 03/15/2019 08:31 AM    HGB 12.0 2018 05:41 AM    HGB 13.0 2017 12:51 PM    HGB 12.8 2017 01:27 PM    HGB 11.9 2015 09:45 AM HCT 30.1 (L) 03/07/2022 10:54 AM    HCT 31.8 (L) 03/06/2022 12:11 PM    HCT 31.3 (L) 12/08/2021 07:35 PM    HCT 42.2 03/23/2020 10:55 AM    HCT 37.0 03/15/2019 08:31 AM    HCT 35.7 05/01/2018 05:41 AM    HCT 38.2 02/24/2017 12:51 PM    HCT 36.2 02/23/2017 01:27 PM    HCT 37.1 08/14/2015 09:45 AM    PLATELET 439 56/05/5489 10:54 AM    PLATELET 459 13/66/0991 12:11 PM    PLATELET 334 61/63/3066 07:35 PM    PLATELET 449 62/41/1060 10:55 AM    PLATELET 274 20/75/7089 08:31 AM    PLATELET 248 71/62/9672 05:41 AM    PLATELET 006 77/48/5153 12:51 PM    PLATELET 619 19/16/0039 01:27 PM    PLATELET 559 01/56/2170 09:45 AM       Recent Results (from the past 24 hour(s))   CBC WITH AUTOMATED DIFF    Collection Time: 03/06/22 12:11 PM   Result Value Ref Range    WBC 8.7 3.6 - 11.0 K/uL    RBC 3.65 (L) 3.80 - 5.20 M/uL    HGB 11.1 (L) 11.5 - 16.0 g/dL    HCT 31.8 (L) 35.0 - 47.0 %    MCV 87.1 80.0 - 99.0 FL    MCH 30.4 26.0 - 34.0 PG    MCHC 34.9 30.0 - 36.5 g/dL    RDW 13.2 11.5 - 14.5 %    PLATELET 381 186 - 422 K/uL    MPV 10.8 8.9 - 12.9 FL    NRBC 0.0 0  WBC    ABSOLUTE NRBC 0.00 0.00 - 0.01 K/uL    NEUTROPHILS 68 32 - 75 %    LYMPHOCYTES 23 12 - 49 %    MONOCYTES 6 5 - 13 %    EOSINOPHILS 1 0 - 7 %    BASOPHILS 1 0 - 1 %    IMMATURE GRANULOCYTES 1 (H) 0.0 - 0.5 %    ABS. NEUTROPHILS 5.9 1.8 - 8.0 K/UL    ABS. LYMPHOCYTES 2.0 0.8 - 3.5 K/UL    ABS. MONOCYTES 0.6 0.0 - 1.0 K/UL    ABS. EOSINOPHILS 0.1 0.0 - 0.4 K/UL    ABS. BASOPHILS 0.0 0.0 - 0.1 K/UL    ABS. IMM.  GRANS. 0.1 (H) 0.00 - 0.04 K/UL    DF AUTOMATED     CBC W/O DIFF    Collection Time: 03/07/22 10:54 AM   Result Value Ref Range    WBC 16.7 (H) 3.6 - 11.0 K/uL    RBC 3.34 (L) 3.80 - 5.20 M/uL    HGB 10.0 (L) 11.5 - 16.0 g/dL    HCT 30.1 (L) 35.0 - 47.0 %    MCV 90.1 80.0 - 99.0 FL    MCH 29.9 26.0 - 34.0 PG    MCHC 33.2 30.0 - 36.5 g/dL    RDW 13.2 11.5 - 14.5 %    PLATELET 349 217 - 727 K/uL    MPV 10.8 8.9 - 12.9 FL    NRBC 0.0 0  WBC    ABSOLUTE NRBC 0.00 0.00 - 0.01 K/uL

## 2022-03-07 NOTE — ROUTINE PROCESS
0145: Bedside and Verbal shift change report given to JODI Robert (oncoming nurse) by Susannah Guajardo RN (offgoing nurse). Report included the following information SBAR.     0300: TRANSFER - OUT REPORT:    Verbal report given to MICHELLE Hurtado RN (name) on Ashu Etienne  being transferred to MIU (unit) for routine progression of care       Report consisted of patients Situation, Background, Assessment and   Recommendations(SBAR). Information from the following report(s) SBAR, Intake/Output and MAR was reviewed with the receiving nurse. Lines:   Peripheral IV 03/06/22 Anterior; Left Forearm (Active)   Site Assessment Clean, dry, & intact 03/07/22 0212   Phlebitis Assessment 0 03/07/22 0212   Infiltration Assessment 0 03/07/22 0212   Dressing Status Clean, dry, & intact 03/07/22 0212   Dressing Type Transparent;Tape 03/07/22 0212   Hub Color/Line Status Pink; Infusing 03/07/22 3611        Opportunity for questions and clarification was provided.       Patient transported with:   Registered Nurse

## 2022-03-07 NOTE — PROGRESS NOTES
Patient states that she does not want anymore medication. States that she will ask for it if she needs it but is not used to taking medication and does not like the way it is making her feel especially with the nausea, vomiting and dizziness. Will continue to  Monitor.

## 2022-03-07 NOTE — OP NOTES
Section Delivery Operative Report     Date of Surgery: 3/6/2022     Preoperative Diagnosis: Arrest of Descent, second stage arrest    Postoperative Diagnosis: same, delivered      Procedure: Procedure(s):   SECTION    Surgeon(s) and Role:     Steve Horton MD - Primary     Anesthesia: Epidural    Procedure Detail:    The patient was taken to the operating room, where epidural anesthesia was found to be adequate. Fetal pillow was placed. The patient was prepped and draped in the normal sterile fashion. Pfannenstiel skin incision was made with the scalpel and carried down to the underlying fascia. The fascial incision was extended laterally with Noble scissors. This fascial incision was grasped with Kocher clamps, tented up, and the underlying rectus muscles were dissected bluntly. The inferior edge of the rectus fascia was grasped with Kocher clamps, tented up, and the underlying rectus muscle was dissected off bluntly. The rectus muscle was divided in the midline bluntly. The peritoneum was entered bluntly  and extended inferiorly and superiorly bluntly. The bladder blade was then inserted. The vesicouterine and peritoneum was identified, grasped with pick-ups and entered sharply with Metzenbaum scissors. The bladder flap was then created digitally and the bladder blade was reinserted. A low transverse uterine incision was made with the scalpel and extended cephalad/caudad with blunt finger dissection. The babys head was then delivered atraumatically; he was in OT presentation. The nose and mouth were suctioned and the baby wa shown to the parents. The cord was clamped and cut after about a minute and the baby was handed off to the waiting  care unit staff. Placenta was then delivered spontaneously. The uterus was exteriorized and cleared of all clots and debris. The uterine incision was closed in two layers. The first layer with running locked layer of 0 Vicryl.  The second layer was an imbricating layer of 0 Vicryl. A figure of 8 stitch was place on the right edge of the hysterotomy on a bleeding area with good hemostasis assured. the uterus was returned to the abdomen. Good hemostasis was again reassured throughout. the fascia was closed with 0 Vicryl in a running fashion. Good hemostasis was assured. The subcuticular layers were reapproximated with 2-0 plain gut in interrupted fashion. The skin was closed with absorbable sutures. The patient tolerated the procedure well. Sponge, lap, and needle counts were correct times two and the patient was taken to recovery in stable condition.       Estimated Blood Loss:  800 cc    Specimens: * No specimens in log *     Cord Blood Results:  Information for the patient's :  [facility] [[id number][facility]   No results found for: PCTABR, PCTDIG, BILI, ABORH     No results found for: APH, APCO2, APO2, AHCO3, ABEC, ABDC, O2ST, SITE, RSCOM     Prenatal Labs:  Lab Results   Component Value Date/Time    HBsAg, External non reactive 07/15/2021 12:00 AM    Rubella, External immune 07/15/2021 12:00 AM    GrBStrep, External negative 2022 12:00 AM

## 2022-03-07 NOTE — PROGRESS NOTES
Labor Progress Note    S: Patient seen, fetal heart rate and contraction pattern evaluated. Checked by RN at [phone] and c/c/1 and started pushing shortly after- has been pushing almost an hour. Physical Exam:  Patient Vitals for the past 4 hrs:   Temp Pulse Resp BP SpO2   22 1800 98.5 °F (36.9 °C)       22 1740  (!) 103  106/72    22 1556 97.8 °F (36.6 °C) 87 16 110/64 95 %         Cervical Exam: c/c/0-+1- not much progress with pushing  Membranes: clear  Uterine Contractions:  Frequency: q2-3 minutes  Fetal Heart Rate: 115s, +accels, early and variable decels, moderate variability      Assessment/Plan:    33 y.o.  at 40w0d IUP  2nd stage labor  Cat 2 tracing  GBS negative    P:  Reviewed with patient not much progress with pushing, reviewed pushing technique, but little movement felt during pushing.   Reviewed taking a pushing break and laboring down for 30 minutes to 1 hour  Updated Dr. Gayle Curry- reviewed patient/progress/strip and agrees with plan    Selvin Maher CNM

## 2022-03-07 NOTE — ANESTHESIA POSTPROCEDURE EVALUATION
Post-Anesthesia Evaluation and Assessment    Patient: Luba Sandoval MRN: [id number]  SSN: [id number]    YOB: 1995  Age: 33 y.o. Sex: female      I have evaluated the patient and they are stable and ready for discharge from the PACU. Cardiovascular Function/Vital Signs  Visit Vitals  /72   Pulse 80   Temp 36.6 °C (97.8 °F)   Resp 16   SpO2 94%   Breastfeeding Unknown       Patient is status post Epidural anesthesia for Procedure(s):   SECTION. Nausea/Vomiting: None    Postoperative hydration reviewed and adequate. Pain:  Pain Scale 1: Numeric (0 - 10) (22)  Pain Intensity 1: 0 (22)   Managed    Neurological Status:   Neuro (WDL): Within Defined Limits (22)   At baseline    Mental Status, Level of Consciousness: Alert and  oriented to person, place, and time    Pulmonary Status:   O2 Device: None (Room air) (22)   Adequate oxygenation and airway patent    Complications related to anesthesia: None    Post-anesthesia assessment completed.  No concerns    Signed By: Andrew Lazaro MD     2022

## 2022-03-07 NOTE — PROGRESS NOTES
Bedside shift change report given to LELA Madera (oncoming nurse) by MICHELLE Chavez RN (offgoing nurse). Report included the following information SBAR.     0930: Patient stating she \"does not feel well\", states that she is nauseous and weak feeling. Spoke with Dr. Heidi Campos, received verbal orders for PRN zofran and pepcid. Also received orders for CBC. Brought medications to room, patient declined meds, stating \"I want to get out of bed\". Obtained CBC and assisted patient to restroom where she brushed her teeth. Assisted patient back to bed where she states \"I do not feel good\". Restarted fluids and gave patient applesauce. Will reevaluate patient in 1 hour.

## 2022-03-07 NOTE — LACTATION NOTE
This note was copied from a baby's chart. Initial Lactation Consultation: Infant born via C/S after pushing for an extended amount of time. Mom is very fatigued. Mom noted breast changes during the pregnancy and has easily expressed colostrum, and a negative history impacting lactation. Infant noted to have a visible, thin frenulum attached at the gumline. Infant nursing at the time of my visit; assisted mom with repositioning infant and obtaining a deeper latch. Infant sucked consistently with swallows heard. Discussed normal  behaviors and feeding patterns to include cluster feeding. Encouraged mom to rest when infant is resting. Feeding Plan: Mother will keep baby skin to skin as often as possible, feed on demand, 8-12x/day , respond to feeding cues, obtain latch, listen for audible swallowing, be aware of signs of oxytocin release/ cramping,thirst,sleepiness while breastfeeding, offer both breasts,and will not limit feedings. Mother agrees to utilize breast massage while nursing to facilitate lactogenesis.

## 2022-03-07 NOTE — PROGRESS NOTES
TRANSFER - IN REPORT:    Verbal report received from Scooby Eng RN(name) on [facility]  being received from labor and delivery(unit) for routine progression of care      Report consisted of patients Situation, Background, Assessment and   Recommendations(SBAR). Information from the following report(s) SBAR, Procedure Summary, Intake/Output, MAR and Recent Results was reviewed with the receiving nurse. Opportunity for questions and clarification was provided. Assessment completed upon patients arrival to unit and care assumed.

## 2022-03-08 LAB
BASOPHILS # BLD: 0 K/UL (ref 0–0.1)
BASOPHILS NFR BLD: 0 % (ref 0–1)
DIFFERENTIAL METHOD BLD: ABNORMAL
EOSINOPHIL # BLD: 0.1 K/UL (ref 0–0.4)
EOSINOPHIL NFR BLD: 1 % (ref 0–7)
ERYTHROCYTE [DISTWIDTH] IN BLOOD BY AUTOMATED COUNT: 13.4 % (ref 11.5–14.5)
HCT VFR BLD AUTO: 26.5 % (ref 35–47)
HGB BLD-MCNC: 9.1 G/DL (ref 11.5–16)
IMM GRANULOCYTES # BLD AUTO: 0.1 K/UL (ref 0–0.04)
IMM GRANULOCYTES NFR BLD AUTO: 1 % (ref 0–0.5)
LYMPHOCYTES # BLD: 2.4 K/UL (ref 0.8–3.5)
LYMPHOCYTES NFR BLD: 18 % (ref 12–49)
MCH RBC QN AUTO: 30.1 PG (ref 26–34)
MCHC RBC AUTO-ENTMCNC: 34.3 G/DL (ref 30–36.5)
MCV RBC AUTO: 87.7 FL (ref 80–99)
MONOCYTES # BLD: 0.8 K/UL (ref 0–1)
MONOCYTES NFR BLD: 6 % (ref 5–13)
NEUTS SEG # BLD: 9.8 K/UL (ref 1.8–8)
NEUTS SEG NFR BLD: 74 % (ref 32–75)
NRBC # BLD: 0 K/UL (ref 0–0.01)
NRBC BLD-RTO: 0 PER 100 WBC
PLATELET # BLD AUTO: 167 K/UL (ref 150–400)
PMV BLD AUTO: 10.8 FL (ref 8.9–12.9)
RBC # BLD AUTO: 3.02 M/UL (ref 3.8–5.2)
WBC # BLD AUTO: 13.2 K/UL (ref 3.6–11)

## 2022-03-08 PROCEDURE — 36415 COLL VENOUS BLD VENIPUNCTURE: CPT

## 2022-03-08 PROCEDURE — 74011250637 HC RX REV CODE- 250/637: Performed by: OBSTETRICS & GYNECOLOGY

## 2022-03-08 PROCEDURE — 65410000002 HC RM PRIVATE OB

## 2022-03-08 PROCEDURE — 85025 COMPLETE CBC W/AUTO DIFF WBC: CPT

## 2022-03-08 RX ORDER — OXYCODONE AND ACETAMINOPHEN 5; 325 MG/1; MG/1
1 TABLET ORAL
Qty: 12 TABLET | Refills: 0 | Status: SHIPPED | OUTPATIENT
Start: 2022-03-08 | End: 2022-03-11

## 2022-03-08 RX ORDER — IBUPROFEN 600 MG/1
600 TABLET ORAL
Qty: 30 TABLET | Refills: 1 | Status: SHIPPED | OUTPATIENT
Start: 2022-03-08

## 2022-03-08 RX ADMIN — Medication: at 16:00

## 2022-03-08 RX ADMIN — Medication: at 09:31

## 2022-03-08 NOTE — ROUTINE PROCESS
0800: Bedside and Verbal shift change report given to Jacinta Erickson RN   (oncoming nurse) by JODI Alaniz RN (offgoing nurse). Report included the following information SBAR.

## 2022-03-08 NOTE — PROGRESS NOTES
Post-Partum Day Number 1 Progress Note    Diana Pal     Assessment: Doing well, post partum day 1    Plan:  - Continue routine postpartum and perineal care as well as maternal education.  - Mild anemia --> Plan fe on DC.  - Declines circ. - Plan discharge home [phone] Discharge Date: Today. Information for the patient's :  Mary Varela [[id number]]   , Low Transverse    Patient doing well without significant complaint. Voiding without difficulty, normal lochia. Would like to go home today. Vitals:  Visit Vitals  /61 (BP 1 Location: Right upper arm, BP Patient Position: At rest)   Pulse 91   Temp 98 °F (36.7 °C)   Resp 16   SpO2 99%   Breastfeeding Unknown     Temp (24hrs), Av.9 °F (36.6 °C), Min:97.5 °F (36.4 °C), Max:98.2 °F (36.8 °C)        Exam:   Patient without distress. Fundus firm, nontender per nursing fundal checks. Perineum with normal lochia noted per nursing assessment. Lower extremities are negative for pathological edema.     Labs:     Lab Results   Component Value Date/Time    WBC 13.2 (H) 2022 01:32 AM    WBC 16.7 (H) 2022 10:54 AM    WBC 8.7 2022 12:11 PM    WBC 10.4 2021 07:35 PM    WBC 5.7 2020 10:55 AM    WBC 5.3 03/15/2019 08:31 AM    WBC 5.8 2018 05:41 AM    WBC 6.5 2017 12:51 PM    WBC 5.1 2017 01:27 PM    WBC 5.3 2015 09:45 AM    HGB 9.1 (L) 2022 01:32 AM    HGB 10.0 (L) 2022 10:54 AM    HGB 11.1 (L) 2022 12:11 PM    HGB 10.7 (L) 2021 07:35 PM    HGB 14.1 2020 10:55 AM    HGB 12.2 03/15/2019 08:31 AM    HGB 12.0 2018 05:41 AM    HGB 13.0 2017 12:51 PM    HGB 12.8 2017 01:27 PM    HGB 11.9 2015 09:45 AM    HCT 26.5 (L) 2022 01:32 AM    HCT 30.1 (L) 2022 10:54 AM    HCT 31.8 (L) 2022 12:11 PM    HCT 31.3 (L) 2021 07:35 PM    HCT 42.2 2020 10:55 AM    HCT 37.0 03/15/2019 08:31 AM    HCT 35.7 05/01/2018 05:41 AM    HCT 38.2 02/24/2017 12:51 PM    HCT 36.2 02/23/2017 01:27 PM    HCT 37.1 08/14/2015 09:45 AM    PLATELET 396 00/28/4076 01:32 AM    PLATELET 506 89/85/7717 10:54 AM    PLATELET 533 63/14/4857 12:11 PM    PLATELET 099 51/12/5872 07:35 PM    PLATELET 364 00/11/8751 10:55 AM    PLATELET 584 44/36/5717 08:31 AM    PLATELET 884 08/93/3183 05:41 AM    PLATELET 718 98/06/2638 12:51 PM    PLATELET 921 58/42/0199 01:27 PM    PLATELET 712 48/76/9058 09:45 AM       Recent Results (from the past 24 hour(s))   CBC W/O DIFF    Collection Time: 03/07/22 10:54 AM   Result Value Ref Range    WBC 16.7 (H) 3.6 - 11.0 K/uL    RBC 3.34 (L) 3.80 - 5.20 M/uL    HGB 10.0 (L) 11.5 - 16.0 g/dL    HCT 30.1 (L) 35.0 - 47.0 %    MCV 90.1 80.0 - 99.0 FL    MCH 29.9 26.0 - 34.0 PG    MCHC 33.2 30.0 - 36.5 g/dL    RDW 13.2 11.5 - 14.5 %    PLATELET 860 648 - 300 K/uL    MPV 10.8 8.9 - 12.9 FL    NRBC 0.0 0  WBC    ABSOLUTE NRBC 0.00 0.00 - 0.01 K/uL   CBC WITH AUTOMATED DIFF    Collection Time: 03/08/22  1:32 AM   Result Value Ref Range    WBC 13.2 (H) 3.6 - 11.0 K/uL    RBC 3.02 (L) 3.80 - 5.20 M/uL    HGB 9.1 (L) 11.5 - 16.0 g/dL    HCT 26.5 (L) 35.0 - 47.0 %    MCV 87.7 80.0 - 99.0 FL    MCH 30.1 26.0 - 34.0 PG    MCHC 34.3 30.0 - 36.5 g/dL    RDW 13.4 11.5 - 14.5 %    PLATELET 908 821 - 844 K/uL    MPV 10.8 8.9 - 12.9 FL    NRBC 0.0 0  WBC    ABSOLUTE NRBC 0.00 0.00 - 0.01 K/uL    NEUTROPHILS 74 32 - 75 %    LYMPHOCYTES 18 12 - 49 %    MONOCYTES 6 5 - 13 %    EOSINOPHILS 1 0 - 7 %    BASOPHILS 0 0 - 1 %    IMMATURE GRANULOCYTES 1 (H) 0.0 - 0.5 %    ABS. NEUTROPHILS 9.8 (H) 1.8 - 8.0 K/UL    ABS. LYMPHOCYTES 2.4 0.8 - 3.5 K/UL    ABS. MONOCYTES 0.8 0.0 - 1.0 K/UL    ABS. EOSINOPHILS 0.1 0.0 - 0.4 K/UL    ABS. BASOPHILS 0.0 0.0 - 0.1 K/UL    ABS. IMM.  GRANS. 0.1 (H) 0.00 - 0.04 K/UL    DF AUTOMATED

## 2022-03-08 NOTE — ROUTINE PROCESS
Bedside and Verbal shift change report given to JODI Zelaya RN (oncoming nurse) by Oksana Conner. Thebruce Mccann RN (offgoing nurse). Report included the following information SBAR.

## 2022-03-08 NOTE — DISCHARGE SUMMARY
Obstetrical Discharge Summary     Name: Jai Da Silva MRN: [id number]  SSN: [id number]    YOB: 1995  Age: 33 y.o. Sex: female      Admit Date: 3/6/2022    Discharge Date: 3/9/2022     Admitting Physician: Dilip Hernandez MD     Attending Physician:  Aleyda Ness MD     Admission Diagnoses: 40 weeks gestation of pregnancy [Z3A.40]  Amniotic fluid leaking [O42.90]  Abdominal pain during pregnancy in third trimester [O26.893, R10.9]    Discharge Diagnoses:   Information for the patient's :  Suellen Russell [[id number]]   Delivery of a 3.445 kg male infant via , Low Transverse on 3/7/2022 at 12:10 AM  by Marika Boudreaux. Apgars were 9  and 9 . Additional Diagnoses:   Hospital Problems  Date Reviewed: 2021          Codes Class Noted POA    Amniotic fluid leaking ICD-10-CM: O42.90  ICD-9-CM: 658.10  3/6/2022 Unknown        40 weeks gestation of pregnancy ICD-10-CM: Z3A.40  ICD-9-CM: V22.2  3/6/2022 Unknown        Abdominal pain during pregnancy in third trimester ICD-10-CM: O26.893, R10.9  ICD-9-CM: 646.83, 789.00  3/6/2022 Unknown             Lab Results   Component Value Date/Time    Rubella, External immune 07/15/2021 12:00 AM    GrBStrep, External negative 2022 12:00 AM       Hospital Course: Normal hospital course following the delivery. Patient Instructions:   Current Discharge Medication List      START taking these medications    Details   ibuprofen (MOTRIN) 600 mg tablet Take 1 Tablet by mouth every six (6) hours as needed for Pain. Qty: 30 Tablet, Refills: 1      oxyCODONE-acetaminophen (PERCOCET) 5-325 mg per tablet Take 1 Tablet by mouth every six (6) hours as needed for Pain for up to 3 days. Max Daily Amount: 4 Tablets.   Qty: 12 Tablet, Refills: 0    Associated Diagnoses: Abdominal pain during pregnancy, third trimester         CONTINUE these medications which have NOT CHANGED    Details   PNV TZ.43/ILUPNVS fum/folic ac (PRENATAL PO) Take 1 Tablet by mouth. Disposition at Discharge: Home or self care    Condition at Discharge: Stable    Reference my discharge instructions.     Follow-up Appointments   Procedures    FOLLOW UP VISIT Appointment in: 6 Weeks     Standing Status:   Standing     Number of Occurrences:   1     Order Specific Question:   Appointment in     Answer:   6 Weeks        Signed By:  Stiven Cazares MD     March 8, 2022

## 2022-03-08 NOTE — LACTATION NOTE
This note was copied from a baby's chart. Per mom, baby nursing well today,  deep latch obtained, mother is comfortable, baby feeding vigorously with rhythmic suck, swallow, breathe pattern, audible swallowing, and evident milk transfer, both breasts offered, baby is asleep following feeding.

## 2022-03-09 VITALS
TEMPERATURE: 98.2 F | OXYGEN SATURATION: 98 % | HEART RATE: 91 BPM | DIASTOLIC BLOOD PRESSURE: 74 MMHG | RESPIRATION RATE: 16 BRPM | SYSTOLIC BLOOD PRESSURE: 116 MMHG

## 2022-03-09 NOTE — PROGRESS NOTES
1940: Bedside shift change report given to Taiwo Lagunas RN (oncoming nurse) by Kendra Orozco RN (offgoing nurse). Report included the following information SBAR, Kardex, Intake/Output, MAR and Recent Results.

## 2022-03-09 NOTE — PROGRESS NOTES
Post-Operative  Day 2    Diana Pal     Assessment: Post-Op day 2 s/p 1LTCS, doing well    Plan:   - Routine post-operative care. - Hgb 9.1 plan Fe on discharge  - Plan for discharge [id number] Frank Kirkpatrick Discharge Date: Today. Per patient request  - COVID neg    Information for the patient's :  Adam Ortiz [[id number]]   , Low Transverse     Patient doing well without significant complaint. Tolerating regular diet. Ambulating. Voiding without difficulty. Strongly desires discharge home today. Vitals:  Visit Vitals  /67 (BP 1 Location: Right upper arm, BP Patient Position: At rest)   Pulse 72   Temp 98 °F (36.7 °C)   Resp 16   SpO2 98%   Breastfeeding Unknown     Temp (24hrs), Av.9 °F (36.6 °C), Min:97.8 °F (36.6 °C), Max:98 °F (36.7 °C)        Exam:       Patient without distress. Fundus firm, nontender per nursing fundal checks. Bandage removed. with ecchymosis on mons, not continguous with the incision. Incision Clean, dry, intact with no defects, erythema, swelling or bleeding                Perineum with normal lochia noted per nursing assessment. Lower extremities are negative for pathological edema.     Labs:   Lab Results   Component Value Date/Time    WBC 13.2 (H) 2022 01:32 AM    WBC 16.7 (H) 2022 10:54 AM    WBC 8.7 2022 12:11 PM    WBC 10.4 2021 07:35 PM    WBC 5.7 2020 10:55 AM    WBC 5.3 03/15/2019 08:31 AM    WBC 5.8 2018 05:41 AM    WBC 6.5 2017 12:51 PM    WBC 5.1 2017 01:27 PM    WBC 5.3 2015 09:45 AM    HGB 9.1 (L) 2022 01:32 AM    HGB 10.0 (L) 2022 10:54 AM    HGB 11.1 (L) 2022 12:11 PM    HGB 10.7 (L) 2021 07:35 PM    HGB 14.1 2020 10:55 AM    HGB 12.2 03/15/2019 08:31 AM    HGB 12.0 2018 05:41 AM    HGB 13.0 2017 12:51 PM    HGB 12.8 2017 01:27 PM    HGB 11.9 2015 09:45 AM    HCT 26.5 (L) 2022 01:32 AM    HCT 30.1 (L) 03/07/2022 10:54 AM    HCT 31.8 (L) 03/06/2022 12:11 PM    HCT 31.3 (L) 12/08/2021 07:35 PM    HCT 42.2 03/23/2020 10:55 AM    HCT 37.0 03/15/2019 08:31 AM    HCT 35.7 05/01/2018 05:41 AM    HCT 38.2 02/24/2017 12:51 PM    HCT 36.2 02/23/2017 01:27 PM    HCT 37.1 08/14/2015 09:45 AM    PLATELET 778 97/94/1148 01:32 AM    PLATELET 347 16/81/1600 10:54 AM    PLATELET 858 85/66/7342 12:11 PM    PLATELET 076 53/21/9942 07:35 PM    PLATELET 855 27/39/9804 10:55 AM    PLATELET 859 61/58/0269 08:31 AM    PLATELET 682 61/99/9385 05:41 AM    PLATELET 447 65/44/6691 12:51 PM    PLATELET 409 91/02/1727 01:27 PM    PLATELET 918 64/37/0278 09:45 AM       No results found for this or any previous visit (from the past 24 hour(s)).

## 2022-03-09 NOTE — DISCHARGE INSTRUCTIONS
Postpartum Support Groups (virtual)  We know that all of us are dealing with a tremendous amount of uncertainty, confusion and disruption to our daily lives, which may result in increased anxiety, depression and fear. If you are feeling unsettled or worse, please know that we are here to help. During this time of increased caution and care for one another, [facility] [place] ([address] [place] [address]) is offering virtual support groups to ALL MOTHERS in [place] regardless of the age of your child/children as a way to help weather this emotional storm together. Social support is an important part of self-care during this time of physical distancing. Virtual postpartum support group meetings available at www. postpartumva.org  Warm Line: [phone]    Breastfeeding Support Groups (virtual)  1st and 3rd Wednesday of each month  2nd and 4th Tuesday of each month    [place] at [email or web address] under the \"About Us\" and \"Classes and Events tabs\"  POSTPARTUM DISCHARGE INSTRUCTIONS       Name:  Yaima Tavarez  YOB: 1995  Admission Diagnosis:  40 weeks gestation of pregnancy [Z3A.40]  Amniotic fluid leaking [O42.90]  Abdominal pain during pregnancy in third trimester [O26.893, R10.9]     Discharge Diagnosis:    Problem List as of 3/9/2022 Date Reviewed: 12/21/2021          Codes Class Noted - Resolved    Amniotic fluid leaking ICD-10-CM: O42.90  ICD-9-CM: 658.10  3/6/2022 - Present        40 weeks gestation of pregnancy ICD-10-CM: Z3A.40  ICD-9-CM: V22.2  3/6/2022 - Present        Abdominal pain during pregnancy in third trimester ICD-10-CM: O26.893, R10.9  ICD-9-CM: 646.83, 789.00  3/6/2022 - Present        RESOLVED: Vaginal discharge during pregnancy in third trimester ICD-10-CM: O26.893, N89.8  ICD-9-CM: 646.83, 623.5  12/8/2021 - 12/21/2021        RESOLVED: JOAQUIN (generalized anxiety disorder) ICD-10-CM: F41.1  ICD-9-CM: 300.02  9/10/2018 - 12/21/2021        RESOLVED: Gastroparesis ICD-10-CM: K31.84  ICD-9-CM: 536.3  2017 - 2021        RESOLVED: Underweight due to inadequate caloric intake ICD-10-CM: R63.6  ICD-9-CM: 783.22  2017 - 2021        RESOLVED: Epigastric pain ICD-10-CM: R10.13  ICD-9-CM: 789.06  3/7/2017 - 2017            Attending Physician:  Dayan Jose MD    Delivery Type:   Section: What to Expect at Home    Your Recovery:  A  section, or , is surgery to deliver your baby through a cut, called an incision that the doctor makes in your lower belly and uterus. You may have some pain in your lower belly and need pain medicine for 1 to 2 weeks. You can expect some vaginal bleeding for several weeks. You will probably need about 6 weeks to fully recover. It is important to take it easy while the incision is healing. Avoid heavy lifting, strenuous activities, or exercises that strain the belly muscles while you are recovering. Ask a family member or friend for help with housework, cooking, and shopping. This care sheet gives you a general idea about how long it will take for you to recover. But each person recovers at a different pace. Follow the steps below to get better as quickly as possible. How can you care for yourself at home? Activity  · Rest when you feel tired. Getting enough sleep will help you recover. · Try to walk each day. Start by walking a little more than you did the day before. Bit by bit, increase the amount you walk. Walking boosts blood flow and helps prevent pneumonia, constipation, and blood clots. · Avoid strenuous activities, such as bicycle riding, jogging, weightlifting, and aerobic exercise, for 6 weeks or until your doctor says it is okay. · Until your doctor says it is okay, do not lift anything heavier than your baby. · Do not do sit-ups or other exercise that strain the belly muscles for 6 weeks or until your doctor says it is okay. · Hold a pillow over your incision when you cough or take deep breaths. This will support your belly and decrease your pain. · You may shower as usual. Pat the incision dry when you are done. · You will have some vaginal bleeding. Wear sanitary pads. Do not douche or use tampons until your doctor says it is okay. · Ask your doctor when you can drive again. · You will probably need to take at least 6 weeks off work. It depends on the type of work you do and how you feel. · Wait until you are healed (about 4 to 6 weeks) before you have sexual intercourse. Your doctor will tell you when it is okay to have sex. · Talk to your doctor about birth control. You can get pregnant even before your period returns. Also, you can get pregnant while you are breast-feeding. Incision care  Your skin is your body's first line of defense against germs, but an incision site leaves an easy way for germs to enter your body. To prevent infection:  · Clean your hands frequently and before and after changing any touching any dressings. · If you have strips of tape on the incision, leave the tape on for a week or until it falls off. · Look at your incision closely every day for any changes. Contact your doctor if you experience any signs of infection, such as fever or increased redness at the surgical site. · Wash the area daily with warm, soapy water, and pat it dry. Don't use hydrogen peroxide or alcohol, which can slow healing. You may cover the area with a gauze bandage if it weeps or rubs against clothing. Change the bandage every day. · Keep the area clean and dry. Diet  · You can eat your normal diet. If your stomach is upset, try bland, low-fat foods like plain rice, broiled chicken, toast, and yogurt. · Drink plenty of fluids (unless your doctor tells you not to). · You may notice that your bowel movements are not regular right after your surgery. This is common. Try to avoid constipation and straining with bowel movements. You may want to take a fiber supplement every day.  If you have not had a bowel movement after a couple of days, ask your doctor about taking a mild laxative. · If you are breast-feeding, do not drink any alcohol. Medicines  · Take pain medicines exactly as directed. · If the doctor gave you a prescription medicine for pain, take it as prescribed. · If you are not taking a prescription pain medicine, ask your doctor if you can take an over-the-counter medicine such as acetaminophen (Tylenol), ibuprofen (Advil, Motrin), or naproxen (Aleve), for cramps. Read and follow all instructions on the label. Do not take aspirin, because it can cause more bleeding. Do not take acetaminophen (Tylenol) and other acetaminophen containing medications (i.e. Percocet) at the same time. · If you think your pain medicine is making you sick to your stomach:  · Take your medicine after meals (unless your doctor has told you not to). · Ask your doctor for a different pain medicine. · If your doctor prescribed antibiotics, take them as directed. Do not stop taking them just because you feel better. You need to take the full course of antibiotics. Mental Health  · Many women get the \"baby blues\" during the first few days after childbirth. You may lose sleep, feel irritable, and cry easily. You may feel happy one minute and sad the next. Hormone changes are one cause of these emotional changes. Also, the demands of a new baby, along with visits from relatives or other family needs, add to a mother's stress. The \"baby blues\" often peak around the fourth day. Then they ease up in less than 2 weeks. · If your moodiness or anxiety lasts for more than 2 weeks, or if you feel like life is not worth living, you may have postpartum depression. This is different for each mother. Some mothers with serious depression may worry intensely about their infant's well-being. Others may feel distant from their child. Some mothers might even feel that they might harm their baby.  A mother may have signs of paranoia, wondering if someone is watching her. · With all the changes in your life, you may not know if you are depressed. Pregnancy sometimes causes changes in how you feel that are similar to the symptoms of depression. · Symptoms of depression include:  · Feeling sad or hopeless and losing interest in daily activities. These are the most common symptoms of depression. · Sleeping too much or not enough. · Feeling tired. You may feel as if you have no energy. · Eating too much or too little. · [facility] ([employer]) offers a Warm line; Chat with the Expert phone sessions; Information and Articles about Pregnancy and Postpartum Mood Disorders; Comprehensive List of Free Support Groups; Knowledgeable local coordinators who will offer support, information, and resources; Guide to Resources on [employer]; Calendar of events in the  mood disorders community; Latest News and Research; and [facility] [address] for [employer]. Remember - You are not alone; You are not to blame; With help, you will be well. 4-960-099-PPD(9225). WWW. POSTPARTUM. NET   · Writing or talking about death, such as writing suicide notes or talking about guns, knives, or pills. Keep the numbers for these national suicide hotlines: 0-145-231-TALK (3-178.937.2004) and 5-550-CKSYWCF (2-444.947.8919). If you or someone you know talks about suicide or feeling hopeless, get help right away. Other instructions  · If you breast-feed your baby, you may be more comfortable while you are healing if you place the baby so that he or she is not resting on your belly. Try tucking your baby under your arm, with his or her body along the side you will be feeding on. Support your baby's upper body with your arm. With that hand you can control your baby's head to bring his or her mouth to your breast. This is sometimes called the football hold. Follow-up care is a key part of your treatment and safety.   Be sure to make and go to all appointments, and call your doctor if you are having problems. It's also a good idea to know your test results and keep a list of the medicines you take. When should you call for help? Call 911 anytime you think you may need emergency care. For example, call if:  · You are thinking of hurting yourself, your baby, or anyone else. · You passed out (lost consciousness). · You have symptoms of a blood clot in your lung (called a pulmonary embolism). These may include:  · Sudden chest pain. · Trouble breathing. · Coughing up blood. Call your doctor now or seek immediate medical care if:    · You have severe vaginal bleeding. · You are soaking through a pad each hour for 2 or more hours. · Your vaginal bleeding seems to be getting heavier or is still bright red 4 days after delivery. · You are dizzy or lightheaded, or you feel like you may faint. · You are vomiting or cannot keep fluids down. · You have a fever. · You have new or more belly pain. · You have loose stitches, or your incision comes open. · You have symptoms of infection, such as:  · Increased pain, swelling, warmth, or redness. · Red streaks leading from the incision. · Pus draining from the incision. · A fever  · You pass tissue (not just blood). · Your vaginal discharge smells bad. · Your belly feels tender or full and hard. · Your breasts are continuously painful or red. · You feel sad, anxious, or hopeless for more than a few days. · You have sudden, severe pain in your belly. · You have symptoms of a blood clot in your leg (called a deep vein thrombosis), such as:  · Pain in your calf, back of the knee, thigh, or groin. · Redness and swelling in your leg or groin. · You have symptoms of preeclampsia, such as:  · Sudden swelling of your face, hands, or feet. · New vision problems (such as dimness or blurring). · A severe headache.   · Your blood pressure is higher than it should be or rises suddenly. · You have new nausea or vomiting. Watch closely for changes in your health, and be sure to contact your doctor if you have any problems. Additional Information:  Not applicable    These are general instructions for a healthy lifestyle:    No smoking/ No tobacco products/ Avoid exposure to second hand smoke    Surgeon General's Warning:  Quitting smoking now greatly reduces serious risk to your health. Obesity, smoking, and sedentary lifestyle greatly increases your risk for illness    A healthy diet, regular physical exercise & weight monitoring are important for maintaining a healthy lifestyle    Recognize signs and symptoms of STROKE:    F-face looks uneven    A-arms unable to move or move unevenly    S-speech slurred or non-existent    T-time-call 911 as soon as signs and symptoms begin - DO NOT go       back to bed or wait to see if you get better - TIME IS BRAIN. I have had the opportunity to make my options or choices for discharge. I have received and understand these instructions.

## 2022-03-10 ENCOUNTER — PATIENT OUTREACH (OUTPATIENT)
Dept: OTHER | Age: 27
End: 2022-03-10

## 2022-03-10 NOTE — PROGRESS NOTES
Care Transitions subsequent Follow Up Call    Call within 2 business days of discharge: Yes     Last Discharge [address]       Complaint Diagnosis Description Type Department Provider    3/6/22 Contractions; Rupture of Membranes 40 weeks gestation of pregnancy . .. ED to Hosp-Admission (Discharged) (ADMIT) Saintclair Coventry, MD          Was this an external facility discharge? No Discharge Facility: [address] contacted the patient by telephone for follow-up call. Verified name and  with patient as identifiers. Risk Factors Identified: primary c/section/multip    Needs to be reviewed by the provider   none         Method of communication with provider : none      Advance Care Planning:   Does patient have an Advance Directive: not on file     Does patient have OB/Gyn Selected? Yes    Discussed follow up appointments. If no appointment was previously scheduled, appointment scheduling offered: Yes  [facility] follow up appointment(s): No future appointments. [facility] follow [facility] appointment(s): [facility] center 3/16/22 appt    OB History:   OB History    Para Term  AB Living   2 1 1   1 1   SAB IAB Ectopic Molar Multiple Live Births   1       0 1      # Outcome Date GA Lbr Alhaji/2nd Weight Sex Delivery Anes PTL Lv   2 Term 22 40w1d 07:40 / 06:30 7 lb 9.5 oz (3.445 kg) M CS-LTranv EPI N RIZWANA   1 SAB                40w1d    Medication reconciliation was performed with patient, who verbalizes understanding of administration of home medications. Advised obtaining a 90-day supply of all daily and as-needed medications. Barriers/Support system:  parents and spouse   Return to work planning? Yes      Postpartum Assessment:  , Lochia-light, Incision-c/d/i, Fever No, BM?  Yes , Decreased urinary output No, Perineal care-n/a, Visual changes No, Calf Pain No, Headache No, Swelling No, Breast pain No, Depression or feelings of sadness or overwhelm-mood good per pt she is happy and enjoying the baby, Breast feeding Yes, Feeding schedule-q 2-3h it is going well and baby gaining weight well so far/slight yellowing of skin bilil levels good discussed how to manage this and signs to look for, Sleep safety and Infant's weight  Patient stated delivery experience: good just c/section unexpected  Risk factors for ED visit or readmission: primary c/section     Patient given an opportunity to ask questions and does not have any further questions or concerns at this time. Were discharge instructions available to patient? Yes. Reviewed appropriate site of care based on symptoms and resources available to patient including: Benefits related nurse triage line, When to call 911 and Condition related references. The patient agrees to contact the OB/Gyn office for questions related to their healthcare. Plan for follow-up call in 10-14 days based on severity of symptoms and risk factors.   Plan for next call: self management-pp care post c/section/ care

## 2022-03-11 NOTE — PATIENT INSTRUCTIONS
Section: What to Expect at [address] [place] [address]     A  section, or , is surgery to deliver your baby through a cut that the doctor makes in your lower belly and uterus. The cut is called an incision. You may have some pain in your lower belly and need pain medicine for 1 to 2 weeks. You can expect some vaginal bleeding for several weeks. You will probably need about 6 weeks to fully recover. It's important to take it easy while the incision heals. Avoid heavy lifting, strenuous activities, and exercises that strain the belly muscles while you recover. Ask a family member or friend for help with housework, cooking, and shopping. This care sheet gives you a general idea about how long it will take for you to recover. But each person recovers at a different pace. Follow the steps below to get better as quickly as possible. How can you care for yourself at home? Activity    · Rest when you feel tired. Getting enough sleep will help you recover.     · Try to walk each day. Start by walking a little more than you did the day before. Bit by bit, increase the amount you walk. Walking boosts blood flow and helps prevent pneumonia, constipation, and blood clots.     · Avoid strenuous activities, such as bicycle riding, jogging, weightlifting, and aerobic exercise, for 6 weeks or until your doctor says it is okay.     · Until your doctor says it is okay, do not lift anything heavier than your baby.     · Do not do sit-ups or other exercises that strain the belly muscles for 6 weeks or until your doctor says it is okay.     · Hold a pillow over your incision when you cough or take deep breaths. This will support your belly and decrease your pain.     · You may shower as usual. Pat the incision dry when you are done.     · You will have some vaginal bleeding. Wear sanitary pads.  Do not douche or use tampons until your doctor says it is okay.     · Ask your doctor when you can drive again.     · You will probably need to take at least 6 weeks off work. It depends on the type of work you do and how you feel.     · Ask your doctor when it is okay for you to have sex. Diet    · You can eat your normal diet. If your stomach is upset, try bland, low-fat foods like plain rice, broiled chicken, toast, and yogurt.     · Drink plenty of fluids (unless your doctor tells you not to).     · You may notice that your bowel movements are not regular right after your surgery. This is common. Try to avoid constipation and straining with bowel movements. You may want to take a fiber supplement every day. If you have not had a bowel movement after a couple of days, ask your doctor about taking a mild laxative.     · If you are breastfeeding, limit alcohol. Alcohol can cause a lack of energy and other health problems for the baby when a breastfeeding woman drinks heavily. It can also get in the way of a mom's ability to feed her baby or to care for the child in other ways. There isn't a lot of research about exactly how much alcohol can harm a baby. Having no alcohol is the safest choice for your baby. If you choose to have a drink now and then, have only one drink, and limit the number of occasions that you have a drink. Wait to breastfeed at least 2 hours after you have a drink to reduce the amount of alcohol the baby may get in the milk. Medicines    · Your doctor will tell you if and when you can restart your medicines. You will also get instructions about taking any new medicines.     · If you take aspirin or some other blood thinner, ask your doctor if and when to start taking it again. Make sure that you understand exactly what your doctor wants you to do.     · Take pain medicines exactly as directed. ? If the doctor gave you a prescription medicine for pain, take it as prescribed.   ? If you are not taking a prescription pain medicine, ask your doctor if you can take an over-the-counter medicine.     · If you think your pain medicine is making you sick to your stomach:  ? Take your medicine after meals (unless your doctor has told you not to). ? Ask your doctor for a different pain medicine.     · If your doctor prescribed antibiotics, take them as directed. Do not stop taking them just because you feel better. You need to take the full course of antibiotics. Incision care    · If you have strips of tape on the incision, leave the tape on for a week or until it falls off.     · Wash the area daily with warm, soapy water, and pat it dry. Don't use hydrogen peroxide or alcohol, which can slow healing. You may cover the area with a gauze bandage if it weeps or rubs against clothing. Change the bandage every day.     · Keep the area clean and dry. Other instructions    · If you breastfeed your baby, you may be more comfortable while you are healing if you don't rest your baby on your belly. Try tucking your baby under your arm, with your baby's body along the side you will be feeding on. Support your baby's upper body with your arm. With that hand you can control your baby's head to bring your baby's mouth to your breast. This is sometimes called the football hold. Follow-up care is a key part of your treatment and safety. Be sure to make and go to all appointments, and call your doctor if you are having problems. It's also a good idea to know your test results and keep a list of the medicines you take. When should you call for help? Share this information with your partner, family, or a friend. They can help you watch for warning signs. Call 911  anytime you think you may need emergency care. For example, call if:    · You have thoughts of harming yourself, your baby, or another person.     · You passed out (lost consciousness).     · You have chest pain, are short of breath, or cough up blood.     · You have a seizure.    Call your doctor now or seek immediate medical care if:    · You have loose stitches, or your incision comes open.     · You have signs of hemorrhage (too much bleeding), such as:  ? Heavy vaginal bleeding. This means that you are soaking through one or more pads in an hour. Or you pass blood clots bigger than an egg. ? Feeling dizzy or lightheaded, or you feel like you may faint. ? Feeling so tired or weak that you cannot do your usual activities. ? A fast or irregular heartbeat. ? New or worse belly pain.     · You have symptoms of infection, such as:  ? Increased pain, swelling, warmth, or redness. ? Red streaks leading from the incision. ? Pus draining from the incision. ? A fever. ? Vaginal discharge that smells bad.  ? New or worse belly pain.     · You have symptoms of a blood clot in your leg (called a deep vein thrombosis), such as:  ? Pain in your calf, back of the knee, thigh, or groin. ? Redness and swelling in your leg or groin.     · You have signs of preeclampsia, such as:  ? Sudden swelling of your face, hands, or feet. ? New vision problems (such as dimness, blurring, or seeing spots). ? A severe headache. Watch closely for changes in your health, and be sure to contact your doctor if:    · Your vaginal bleeding isn't decreasing.     · You feel sad, anxious, or hopeless for more than a few days.     · You are having problems with your breasts or breastfeeding. Where can you learn more? Go to http://www.orozco.com/  Enter M806 in the search box to learn more about \" Section: What to Expect at Home. \"  Current as of: 2021               Content Version: 13.2  © 9031-7130 [employer]. Care instructions adapted under license by [employer] (which disclaims liability or warranty for this information). If you have questions about a medical condition or this instruction, always ask your healthcare professional. [address] any warranty or liability for your use of this information. Nutrition for Breastfeeding: Care Instructions  Overview     If you are breastfeeding, your doctor may suggest that you eat more calories each day than otherwise recommended for a person of your height and weight. Breastfeeding helps build the bond between you and your baby. It gives your baby excellent health benefits. A healthy diet includes eating a variety of foods from the basic food groups: grains, vegetables, fruits, milk and milk products (such as cheese and yogurt), and meat and dried beans. Eating well during breastfeeding will ensure that you stay healthy. Follow-up care is a key part of your treatment and safety. Be sure to make and go to all appointments, and call your doctor if you are having problems. It's also a good idea to know your test results and keep a list of the medicines you take. How can you care for yourself at home? Making good choices about what you eat and drink when you are breastfeeding can help you stay healthy. It can also help your baby stay healthy. Here are some things you can do. Eat a variety of healthy foods. This includes vegetables, fruits, milk products, whole grains, and protein. Drink plenty of fluids. Make water your first choice. If you have kidney, heart, or liver disease and have to limit fluids, talk with your doctor before you increase your fluids. Avoid fish with high mercury. This includes shark, swordfish, carito mackerel, and marlin. It also includes orange roughy, bigeye tuna, and tilefish from the [facility]. Instead, eat fish that is low in mercury. Choose canned light tuna, salmon, pollock, catfish, or shrimp. Limit caffeine. Things like coffee, tea, chocolate, and some sodas can contain caffeine. Caffeine can pass through breast milk to your baby. It may cause fussiness and sleep problems. Talk to your doctor about how much caffeine is safe for you. Limit alcohol. Alcohol can pass through breast milk to your baby.  Talk to your doctor if you have questions about drinking alcohol while breastfeeding. Be safe with supplements. Talk with your doctor before taking any vitamins, minerals, and herbal or other dietary supplements. When should you call for help? Watch closely for changes in your health, and be sure to contact your doctor if you have any problems. Where can you learn more? Go to http://www.gray.com/  Enter P234 in the search box to learn more about \"Nutrition for Breastfeeding: Care Instructions. \"  Current as of: September 8, 2021               Content Version: 13.2  © 6199-8526 [employer]. Care instructions adapted under license by [employer] (which disclaims liability or warranty for this information). If you have questions about a medical condition or this instruction, always ask your healthcare professional. [address] any warranty or liability for your use of this information.

## 2022-03-18 PROBLEM — O42.90 AMNIOTIC FLUID LEAKING: Status: ACTIVE | Noted: 2022-03-06

## 2022-03-20 PROBLEM — R10.9 ABDOMINAL PAIN DURING PREGNANCY IN THIRD TRIMESTER: Status: ACTIVE | Noted: 2022-03-06

## 2022-03-20 PROBLEM — Z3A.40 40 WEEKS GESTATION OF PREGNANCY: Status: ACTIVE | Noted: 2022-03-06

## 2022-03-20 PROBLEM — O26.893 ABDOMINAL PAIN DURING PREGNANCY IN THIRD TRIMESTER: Status: ACTIVE | Noted: 2022-03-06

## 2022-04-22 ENCOUNTER — PATIENT OUTREACH (OUTPATIENT)
Dept: OTHER | Age: 27
End: 2022-04-22

## 2022-04-22 NOTE — PROGRESS NOTES
Care Transitions subsequent Follow Up Call    Call within 2 business days of discharge: Yes     Last Discharge [address]       Complaint Diagnosis Description Type Department Provider    3/6/22 Contractions; Rupture of Membranes 40 weeks gestation of pregnancy . .. ED to Hosp-Admission (Discharged) (ADMIT) [id number] Sunil Knight MD        Maternity Care Manager contacted the patient by telephone for follow-up call. Verified name and  with patient as identifiers. Risk Factors Identified: primary c/section    Needs to be reviewed by the provider   none         Method of communication with provider : none      Advance Care Planning:   Does patient have an Advance Directive: not on file     Does patient have OB/Gyn Selected? Yes    Discussed follow up appointments. If no appointment was previously scheduled, appointment scheduling offered: Yes  [facility] follow up appointment(s): No future appointments. [facility] follow up appointment(s): [facility] [address]    OB History:   OB History    Para Term  AB Living   2 1 1   1 1   SAB IAB Ectopic Molar Multiple Live Births   1       0 1      # Outcome Date GA Lbr Alhaji/2nd Weight Sex Delivery Anes PTL Lv   2 Term 22 40w1d 07:40 / 06:30 7 lb 9.5 oz (3.445 kg) M CS-LTranv EPI N RIZWANA   1 SAB                Unknown    Medication reconciliation was performed with patient, who verbalizes understanding of administration of home medications. Advised obtaining a 90-day supply of all daily and as-needed medications. Barriers/Support system:  spouse   Return to work planning? Yes    Postpartum Assessment:   delivery 3/7/22  S/w pt she and the baby are doing well, there are no additional concerns. Pt has had her f/u ob visit and was cleared for regular duty. Pt is still off on maternity leave. Resolving current episode  No further ED/UC or hospital admissions within 30 days post discharge.  Patient attended follow-up appointments as directed. No outreach from patient to [address] [place] [address]. Patient given an opportunity to ask questions and does not have any further questions or concerns at this time. Reviewed appropriate site of care based on symptoms and resources available to patient including: Benefits related nurse triage line, When to call 911 and Condition related references. The patient agrees to contact the OB/Gyn office for questions related to their healthcare.

## (undated) DEVICE — COVERALL PREM SMS 2XL KNIT --

## (undated) DEVICE — STERILE POLYISOPRENE POWDER-FREE SURGICAL GLOVES: Brand: PROTEXIS

## (undated) DEVICE — PACK PROCEDURE SURG C SECT KT SMH

## (undated) DEVICE — SOLIDIFIER FLD 2OZ 1500CC N DISINF IN BTL DISP SAFESORB

## (undated) DEVICE — SOLUTION IRRIG 1000ML 0.9% SOD CHL USP POUR PLAS BTL

## (undated) DEVICE — STERILE POLYISOPRENE POWDER-FREE SURGICAL GLOVES WITH EMOLLIENT COATING: Brand: PROTEXIS

## (undated) DEVICE — 3000CC GUARDIAN II: Brand: GUARDIAN

## (undated) DEVICE — LIGHT HANDLE: Brand: DEVON

## (undated) DEVICE — SOLUTION IRRIG 1000ML STRL H2O USP PLAS POUR BTL

## (undated) DEVICE — STAPLER SKIN SQ 30 ABSRB STPL DISP INSORB

## (undated) DEVICE — SUTURE VCRL SZ 0 L36IN ABSRB UD L40MM CT 1/2 CIR TAPERPOINT J958H

## (undated) DEVICE — DRAPE FLD WRM W44XL66IN C6L FOR INTRATEMP SYS THERMABASIN

## (undated) DEVICE — DEVON™ KNEE AND BODY STRAP 60" X 3" (1.5 M X 7.6 CM): Brand: DEVON

## (undated) DEVICE — DRESSING SIL W4XL5IN ANTIBACT GELLING FBR CYTOFORM

## (undated) DEVICE — ROYALSILK SURGICAL GOWN, L: Brand: CONVERTORS

## (undated) DEVICE — CATH FOLEY 16F LUBRI-SIL IC --

## (undated) DEVICE — MEDI-VAC NON-CONDUCTIVE SUCTION TUBING: Brand: CARDINAL HEALTH

## (undated) DEVICE — KENDALL SCD EXPRESS SLEEVES, KNEE LENGTH, MEDIUM: Brand: KENDALL SCD

## (undated) DEVICE — REM POLYHESIVE ADULT PATIENT RETURN ELECTRODE: Brand: VALLEYLAB